# Patient Record
Sex: MALE | Race: WHITE | NOT HISPANIC OR LATINO | Employment: OTHER | URBAN - METROPOLITAN AREA
[De-identification: names, ages, dates, MRNs, and addresses within clinical notes are randomized per-mention and may not be internally consistent; named-entity substitution may affect disease eponyms.]

---

## 2017-01-09 ENCOUNTER — TELEPHONE (OUTPATIENT)
Dept: CARDIOLOGY | Facility: MEDICAL CENTER | Age: 82
End: 2017-01-09
Payer: MEDICARE

## 2017-01-09 DIAGNOSIS — R20.0 NUMBNESS AND TINGLING IN BOTH HANDS: ICD-10-CM

## 2017-01-09 DIAGNOSIS — G47.34 NOCTURNAL HYPOXIA: ICD-10-CM

## 2017-01-09 DIAGNOSIS — R06.00 DYSPNEA, UNSPECIFIED TYPE: Chronic | ICD-10-CM

## 2017-01-09 DIAGNOSIS — R42 DIZZINESS: ICD-10-CM

## 2017-01-09 DIAGNOSIS — I48.91 ATRIAL FIBRILLATION, UNSPECIFIED TYPE (HCC): ICD-10-CM

## 2017-01-09 DIAGNOSIS — R20.2 TINGLING IN EXTREMITIES: ICD-10-CM

## 2017-01-09 DIAGNOSIS — R42 INTERMITTENT LIGHTHEADEDNESS: ICD-10-CM

## 2017-01-09 DIAGNOSIS — R20.2 NUMBNESS AND TINGLING IN BOTH HANDS: ICD-10-CM

## 2017-01-09 DIAGNOSIS — M79.646 PAIN OF FINGER, UNSPECIFIED LATERALITY: ICD-10-CM

## 2017-01-09 NOTE — TELEPHONE ENCOUNTER
Tingling and numbness in his hands for 3-4 weeks, about every night but also sometime during the day, can be both hands at the same time or one sided. For about a year, but seldomly, he has had periods of lightheadedness. Does use oxygen at night. He says he has 50% O2 left in his concentrator but has not had a sleep study recently. Sandeep of a-fib. Called PCP about it and was told it may be circulation issues and should call his cardiologist.      Robert MIJARES.

## 2017-01-09 NOTE — TELEPHONE ENCOUNTER
----- Message from Eric Mcgee, Med Ass't sent at 1/9/2017 10:04 AM PST -----  Regarding: Hands falling asleep X 3 weeks  Contact: [phone]  Isaias Cole called and stated for about 3 weeks now almost nightly his hand has been falling asleep. He says it does alternate hands and lasts about 10 min, sometime longer.    He would like a call back at: [phone].    Thank you so much,    Eric

## 2017-01-10 NOTE — TELEPHONE ENCOUNTER
Do his hands turn white or blue for a few seconds and then return to normal? Check UE arterial and venous duplex, as well as carotid duplex bilaterally. Check OPO on oxygen at night please as well. Get him in to see JI within a few weeks once testing completed. SC

## 2017-01-10 NOTE — TELEPHONE ENCOUNTER
S/w pt he says his hand do not change colors. Explained the tests SC recommends and he agrees to have these done. Pt uses [facility] in [place] for his O2 supplies. Called [facility] and notified them of needing repeat OPO. Faxed order for OPO w/ O2 to [facility] at [phone].    Handed ultrasound orders to Ann in scheduling to schedule in [place] at [place]. Someone will be calling pt about scheduling these.      Pt scheduled with DYLLAN Friday 3/10 in [place] to review studies and assess. Pt does not have transportation and needs to see Dyllan in [place] and this is next appt available. Pt will call if sx worsen and we will call with results.

## 2017-01-19 RX ORDER — ATENOLOL 50 MG/1
50 TABLET ORAL DAILY
Qty: 30 TAB | Refills: 11 | Status: SHIPPED | OUTPATIENT
Start: 2017-01-19 | End: 2018-03-01 | Stop reason: CLARIF

## 2017-01-20 NOTE — TELEPHONE ENCOUNTER
OPO results are normal on 2L O2. Called pt and notified. Tingling is still present every once in awhile. Waiting for US of extremities and carotid. Pt requesting atenolol sent to mail scripts. rx sent to [facility].

## 2017-02-01 ENCOUNTER — TELEPHONE (OUTPATIENT)
Dept: CARDIOLOGY | Facility: MEDICAL CENTER | Age: 82
End: 2017-02-01

## 2017-02-01 NOTE — TELEPHONE ENCOUNTER
----- Message from Lisha Brunson sent at 2/1/2017 10:24 AM PST -----  Regarding: codes for arterial ultrasound  Contact: [phone]  [place] FOR: DYLLAN/darlene  (pt is with Shahnaz now for pre registration)  Shahnaz from [facility] in [place] needs additional codes for arterial ultrasound for eval of upper extremity to get test passed thru medicare  [phone].   Pt is with Shahnaz for pre registration in preparation for his test on 2/13.   (Fax is: [phone])

## 2017-02-01 NOTE — TELEPHONE ENCOUNTER
Dicussed diagnosis and ICD codes. Shahnaz was able to get the test to pass medicare requirements.

## 2017-02-14 DIAGNOSIS — G47.34 NOCTURNAL HYPOXIA: ICD-10-CM

## 2017-02-14 DIAGNOSIS — R06.00 DYSPNEA, UNSPECIFIED TYPE: Chronic | ICD-10-CM

## 2017-02-14 DIAGNOSIS — R42 INTERMITTENT LIGHTHEADEDNESS: ICD-10-CM

## 2017-02-14 DIAGNOSIS — R20.0 NUMBNESS AND TINGLING IN BOTH HANDS: ICD-10-CM

## 2017-02-14 DIAGNOSIS — R20.2 NUMBNESS AND TINGLING IN BOTH HANDS: ICD-10-CM

## 2017-02-21 ENCOUNTER — TELEPHONE (OUTPATIENT)
Dept: CARDIOLOGY | Facility: MEDICAL CENTER | Age: 82
End: 2017-02-21

## 2017-02-22 NOTE — TELEPHONE ENCOUNTER
S/w pt about negative US for DVT. Pt states he is having bilateral numbness in hands that tends to go away with time. Discussed if pt has any worsening symptoms to call back. Pt has appointment to see MD on 3/10

## 2017-03-10 ENCOUNTER — OFFICE VISIT (OUTPATIENT)
Dept: CARDIOLOGY | Facility: PHYSICIAN GROUP | Age: 82
End: 2017-03-10
Payer: MEDICARE

## 2017-03-10 VITALS
BODY MASS INDEX: 23.25 KG/M2 | HEIGHT: 75 IN | OXYGEN SATURATION: 95 % | HEART RATE: 79 BPM | WEIGHT: 187 LBS | SYSTOLIC BLOOD PRESSURE: 112 MMHG | DIASTOLIC BLOOD PRESSURE: 70 MMHG

## 2017-03-10 DIAGNOSIS — I25.10 CORONARY ARTERY DISEASE INVOLVING NATIVE CORONARY ARTERY OF NATIVE HEART WITHOUT ANGINA PECTORIS: ICD-10-CM

## 2017-03-10 DIAGNOSIS — Z79.01 CHRONIC ANTICOAGULATION: ICD-10-CM

## 2017-03-10 DIAGNOSIS — I48.91 ATRIAL FIBRILLATION, UNSPECIFIED TYPE (HCC): ICD-10-CM

## 2017-03-10 DIAGNOSIS — E78.5 DYSLIPIDEMIA: ICD-10-CM

## 2017-03-10 DIAGNOSIS — I42.0 DILATED CARDIOMYOPATHY (HCC): ICD-10-CM

## 2017-03-10 DIAGNOSIS — I10 ESSENTIAL HYPERTENSION, BENIGN: ICD-10-CM

## 2017-03-10 PROCEDURE — G8420 CALC BMI NORM PARAMETERS: HCPCS | Performed by: INTERNAL MEDICINE

## 2017-03-10 PROCEDURE — G8432 DEP SCR NOT DOC, RNG: HCPCS | Performed by: INTERNAL MEDICINE

## 2017-03-10 PROCEDURE — 99214 OFFICE O/P EST MOD 30 MIN: CPT | Performed by: INTERNAL MEDICINE

## 2017-03-10 PROCEDURE — G8598 ASA/ANTIPLAT THER USED: HCPCS | Performed by: INTERNAL MEDICINE

## 2017-03-10 PROCEDURE — 1101F PT FALLS ASSESS-DOCD LE1/YR: CPT | Mod: 8P | Performed by: INTERNAL MEDICINE

## 2017-03-10 PROCEDURE — G8484 FLU IMMUNIZE NO ADMIN: HCPCS | Performed by: INTERNAL MEDICINE

## 2017-03-10 PROCEDURE — 1036F TOBACCO NON-USER: CPT | Performed by: INTERNAL MEDICINE

## 2017-03-10 PROCEDURE — 4040F PNEUMOC VAC/ADMIN/RCVD: CPT | Mod: 8P | Performed by: INTERNAL MEDICINE

## 2017-03-10 ASSESSMENT — ENCOUNTER SYMPTOMS
CHILLS: 0
BLURRED VISION: 0
PND: 0
SHORTNESS OF BREATH: 1
PALPITATIONS: 0
LOSS OF CONSCIOUSNESS: 0
DIZZINESS: 0
ABDOMINAL PAIN: 0
INSOMNIA: 0
MYALGIAS: 0
FEVER: 0
ORTHOPNEA: 0

## 2017-03-10 NOTE — Clinical Note
[facility] [address]  Phone: [phone]  Fax: [phone]              Tej Garrett  9/6/1928    Encounter Date: 3/10/2017    Kofi Moseley M.D.          PROGRESS NOTE:  Subjective:   Tej Garrett is a 87 y.o. male who presents today for follow up of fatigue and shortness of breath with history of atrial fibrillation on chronic anticoagulation therapy.    Since the patient's last visit on 08/29/13, he has been experiencing fatigue and shortness of breath. He denies chest pain, palpitations, nausea/vomiting or diaphoresis. He underwent an echocardiogram which was abnormal as described.    Past Medical History   Diagnosis Date   • Arthritis    • Arrhythmia    • HTN (hypertension)    • prostate      TURP   • depression    • Asthma    • PAC (premature atrial contraction) 11/23/2009   • Benign essential hypertension 11/23/2009   • Chest tightness or pressure 11/23/2009   • Dyspnea 11/23/2009   • Palpitations 11/23/2009     Past Surgical History   Procedure Laterality Date   • Other       Bladder tumor removed   • Jennifer by laparoscopy  8/8/2010     Performed by IMMANUEL GARCIA at SURGERY [facility] ORS   • Umbilical hernia repair  8/8/2010     Performed by IMMANUEL GARCIA at SURGERY [facility] ORS     History reviewed. No pertinent family history.  History   Smoking status   • Former Smoker -- 2.00 packs/day for 30 years   Smokeless tobacco   • Never Used     Comment: Quit 10years ago     Allergies   Allergen Reactions   • Antihistamine Decongestant [Dexbrompheniramine-Pseudoeph] Palpitations     Medications reviewed.    Outpatient Encounter Prescriptions as of 3/10/2017   Medication Sig Dispense Refill   • atenolol (TENORMIN) 50 MG Tab Take 1 Tab by mouth every day. 30 Tab 11   • warfarin (COUMADIN) 2.5 MG Tab   0   • CALCIUM PO Take  by mouth.     • Glycerin, Laxative, (GLYCERIN, ADULT,) 2 GM suppository Insert  "1 Suppository in rectum Once.     • omeprazole (PRILOSEC) 40 MG capsule Take 40 mg by mouth every day.     • anastrozole (ARIMIDEX) 1 MG TABS Take 1 mg by mouth every day.     • Ferrous Sulfate (IRON) 325 (65 FE) MG TABS Take  by mouth.     • naproxen (NAPROSYN) 500 MG TABS Take 500 mg by mouth 2 times a day, with meals.       • magnesium chloride SR (SLOW-MAG) 535 (64 MG) MG TBCR Take 535 mg by mouth 2 Times a Day.       • albuterol (PROVENTIL) 4 MG TABS Take 4 mg by mouth 2 Times a Day.     • warfarin (COUMADIN) 7.5 MG TABS Take 7.5 mg by mouth every day.     • Glycerin-Polysorbate 80 (REFRESH DRY EYE THERAPY OP) by Ophthalmic route.     • fluoxetine (PROZAC) 20 MG CAPS Take 20 mg by mouth every Monday, Wednesday, and Friday.     • carvedilol (COREG) 12.5 MG Tab Take 1 Tab by mouth 2 times a day, with meals. 60 Tab 11   • carvedilol (COREG) 12.5 MG Tab Take 1 Tab by mouth 2 times a day, with meals. 90 Tab 3   • doxazosin (CARDURA) 2 MG TABS Take 2 mg by mouth every day.       No facility-administered encounter medications on file as of 3/10/2017.     Review of Systems   Constitutional: Positive for malaise/fatigue. Negative for fever and chills.   HENT: Negative for congestion.         Runny eyes.   Eyes: Negative for blurred vision.   Respiratory: Positive for shortness of breath.    Cardiovascular: Negative for chest pain, palpitations, orthopnea, leg swelling and PND.   Gastrointestinal: Negative for abdominal pain.   Genitourinary: Negative for dysuria.   Musculoskeletal: Negative for myalgias and joint pain.   Skin: Negative for rash.   Neurological: Negative for dizziness and loss of consciousness.   Psychiatric/Behavioral: The patient does not have insomnia.         Objective:   /70 mmHg  Pulse 79  Ht 1.892 m (6' 2.5\")  Wt 84.823 kg (187 lb)  BMI 23.70 kg/m2  SpO2 95%    Physical Exam   Constitutional: He is oriented to person, place, and time. He appears well-developed and well-nourished.   "   HENT:   Head: Normocephalic and atraumatic.   Eyes: Conjunctivae are normal. Pupils are equal, round, and reactive to light.   Neck: Normal range of motion. Neck supple.   Cardiovascular: Normal rate.  An irregularly irregular rhythm present.   Pulmonary/Chest: Effort normal and breath sounds normal.   Abdominal: Soft. Bowel sounds are normal.   Musculoskeletal: Normal range of motion. He exhibits no edema.   Neurological: He is alert and oriented to person, place, and time.   Skin: Skin is warm and dry.   Psychiatric: He has a normal mood and affect.     CARDIAC STUDIES/PROCEDURES:    CARDIAC CATHETERIZATION CONCLUSIONS by Dr. Dalal (08/05/10)  1. Minimal coronary artery disease with mild plaquing in the   proximal circumflex and probable mild bridging in the mid left   anterior descending.   2. Normal left ventricular systolic function.   3. Systemic hypertension.   4. Moderate dilatation of the aortic root without aortic insufficiency.    CAROTID ULTRASOUND (02/13/17)  Carotid ultrasound showing moderate stenosis.    CT OF CHEST (11/01/13)  Moderate atherosclerosis with coronary involvement.    ECHOCARDIOGRAM CONCLUSIONS (07/29/13)  Echocardiogram showing ejection fraction of 45%, mild mitral regurgitation and stenosis. The ascending aorta was not well visualized.    ECHOCARDIOGRAM CONCLUSIONS (07/29/13)  Echocardiogram showing ejection fraction of 50-55%, no significant valvular abnormalities. There is mild dilation of ascending aorta.    EKG performed on (03/28/13): EKG shows atrial fibrillation with right bundle branch block.    Laboratory results of (10/27/12). Cholesterol profile of 160/65/49/98 noted.UPPER EXTREMITY ARTERIAL ULTRASOUND    UPPER EXTREMITY ARTERIAL ULTRASOUND  Upper extremity arterial study showing no hemodynamically significant stenosis is demonstrated involving either upper arterial system.    UPPER  EXTREMITY VENOUS ULTRASOUND (02/13/17)  Bilateral upper extremity venous study showing no  evidence of deep venous thrombosis.    Assessment:     Patient Active Problem List    Diagnosis Date Noted   • Atrial fibrillation/Chronic anticoagulation 12/10/2012     Priority: High   • Dilated cardiomyopathy 12/10/2012     Priority: High   • CAD (coronary artery disease) 09/08/2014     Priority: Medium   • Hyperlipidemia 09/08/2014     Priority: Low   • Hypertension 09/08/2014     Priority: Low   • Aortic root dilatation 09/08/2014     Medical Decision Making:  Today's Assessment / Status / Plan:     1. Atrial fibrillation on anticoagulation therapy (warfarin): He is experiencing fatigue and shortness of breath.   2. Dilated cardiomyopathy: New reduced left ventricular systolic function noted. We will perform a myocardial perfusion imaging study. We will repeat an echocardiogram in 6 months.  3. Coronary artery disease: He remains clinically stable. We will continue with current medical care. Plan as above.  4. Hypertension: Blood pressure is well controlled.  5. Hyperlipidemia: Stable on strict diet and exercise therapy. We will repeat labs including fasting lipid profile in 6 months.  6. Ascending aorta dilation: Mild per last echocardiogram.  7. Breast cancer treated with Tamoxifen.    We will follow up in six months with labs echocardiogram and myocardial perfusion scan.    CC Alejandro Barrera        No Recipients

## 2017-03-10 NOTE — PROGRESS NOTES
Subjective:   Tej Garrett is a 87 y.o. male who presents today for follow up of fatigue and shortness of breath with history of atrial fibrillation on chronic anticoagulation therapy.    Since the patient's last visit on 08/29/13, he has been experiencing fatigue and shortness of breath. He denies chest pain, palpitations, nausea/vomiting or diaphoresis. He underwent an echocardiogram which was abnormal as described.    Past Medical History   Diagnosis Date   • Arthritis    • Arrhythmia    • HTN (hypertension)    • prostate      TURP   • depression    • Asthma    • PAC (premature atrial contraction) 11/23/2009   • Benign essential hypertension 11/23/2009   • Chest tightness or pressure 11/23/2009   • Dyspnea 11/23/2009   • Palpitations 11/23/2009     Past Surgical History   Procedure Laterality Date   • Other       Bladder tumor removed   • Jennifer by laparoscopy  8/8/2010     Performed by IMMANUEL GARCIA at SURGERY [facility]   • Umbilical hernia repair  8/8/2010     Performed by IMMANUEL GARCIA at [facility]     History reviewed. No pertinent family history.  History   Smoking status   • Former Smoker -- 2.00 packs/day for 30 years   Smokeless tobacco   • Never Used     Comment: Quit 10years ago     Allergies   Allergen Reactions   • Antihistamine Decongestant [Dexbrompheniramine-Pseudoeph] Palpitations     Medications reviewed.    Outpatient Encounter Prescriptions as of 3/10/2017   Medication Sig Dispense Refill   • atenolol (TENORMIN) 50 MG Tab Take 1 Tab by mouth every day. 30 Tab 11   • warfarin (COUMADIN) 2.5 MG Tab   0   • CALCIUM PO Take  by mouth.     • Glycerin, Laxative, (GLYCERIN, ADULT,) 2 GM suppository Insert 1 Suppository in rectum Once.     • omeprazole (PRILOSEC) 40 MG capsule Take 40 mg by mouth every day.     • anastrozole (ARIMIDEX) 1 MG TABS Take 1 mg by mouth every day.     • Ferrous Sulfate (IRON) 325 (65 FE) MG TABS Take  by mouth.     • naproxen (NAPROSYN) 500 MG  "TABS Take 500 mg by mouth 2 times a day, with meals.       • magnesium chloride SR (SLOW-MAG) 535 (64 MG) MG TBCR Take 535 mg by mouth 2 Times a Day.       • albuterol (PROVENTIL) 4 MG TABS Take 4 mg by mouth 2 Times a Day.     • warfarin (COUMADIN) 7.5 MG TABS Take 7.5 mg by mouth every day.     • Glycerin-Polysorbate 80 (REFRESH DRY EYE THERAPY OP) by Ophthalmic route.     • fluoxetine (PROZAC) 20 MG CAPS Take 20 mg by mouth every Monday, Wednesday, and Friday.     • carvedilol (COREG) 12.5 MG Tab Take 1 Tab by mouth 2 times a day, with meals. 60 Tab 11   • carvedilol (COREG) 12.5 MG Tab Take 1 Tab by mouth 2 times a day, with meals. 90 Tab 3   • doxazosin (CARDURA) 2 MG TABS Take 2 mg by mouth every day.       No facility-administered encounter medications on file as of 3/10/2017.     Review of Systems   Constitutional: Positive for malaise/fatigue. Negative for fever and chills.   HENT: Negative for congestion.         Runny eyes.   Eyes: Negative for blurred vision.   Respiratory: Positive for shortness of breath.    Cardiovascular: Negative for chest pain, palpitations, orthopnea, leg swelling and PND.   Gastrointestinal: Negative for abdominal pain.   Genitourinary: Negative for dysuria.   Musculoskeletal: Negative for myalgias and joint pain.   Skin: Negative for rash.   Neurological: Negative for dizziness and loss of consciousness.   Psychiatric/Behavioral: The patient does not have insomnia.         Objective:   /70 mmHg  Pulse 79  Ht 1.892 m (6' 2.5\")  Wt 84.823 kg (187 lb)  BMI 23.70 kg/m2  SpO2 95%    Physical Exam   Constitutional: He is oriented to person, place, and time. He appears well-developed and well-nourished.   HENT:   Head: Normocephalic and atraumatic.   Eyes: Conjunctivae are normal. Pupils are equal, round, and reactive to light.   Neck: Normal range of motion. Neck supple.   Cardiovascular: Normal rate.  An irregularly irregular rhythm present.   Pulmonary/Chest: Effort " normal and breath sounds normal.   Abdominal: Soft. Bowel sounds are normal.   Musculoskeletal: Normal range of motion. He exhibits no edema.   Neurological: He is alert and oriented to person, place, and time.   Skin: Skin is warm and dry.   Psychiatric: He has a normal mood and affect.     CARDIAC STUDIES/PROCEDURES:    CARDIAC CATHETERIZATION CONCLUSIONS by Dr. Dalal (08/05/10)  1. Minimal coronary artery disease with mild plaquing in the   proximal circumflex and probable mild bridging in the mid left   anterior descending.   2. Normal left ventricular systolic function.   3. Systemic hypertension.   4. Moderate dilatation of the aortic root without aortic insufficiency.    CAROTID ULTRASOUND (02/13/17)  Carotid ultrasound showing moderate stenosis.    CT OF CHEST (11/01/13)  Moderate atherosclerosis with coronary involvement.    ECHOCARDIOGRAM CONCLUSIONS (07/29/13)  Echocardiogram showing ejection fraction of 45%, mild mitral regurgitation and stenosis. The ascending aorta was not well visualized.    ECHOCARDIOGRAM CONCLUSIONS (07/29/13)  Echocardiogram showing ejection fraction of 50-55%, no significant valvular abnormalities. There is mild dilation of ascending aorta.    EKG performed on (03/28/13): EKG shows atrial fibrillation with right bundle branch block.    Laboratory results of (10/27/12). Cholesterol profile of 160/65/49/98 noted.UPPER EXTREMITY ARTERIAL ULTRASOUND    UPPER EXTREMITY ARTERIAL ULTRASOUND  Upper extremity arterial study showing no hemodynamically significant stenosis is demonstrated involving either upper arterial system.    UPPER  EXTREMITY VENOUS ULTRASOUND (02/13/17)  Bilateral upper extremity venous study showing no evidence of deep venous thrombosis.    Assessment:     Patient Active Problem List    Diagnosis Date Noted   • Atrial fibrillation/Chronic anticoagulation 12/10/2012     Priority: High   • Dilated cardiomyopathy 12/10/2012     Priority: High   • CAD (coronary artery  disease) 09/08/2014     Priority: Medium   • Hyperlipidemia 09/08/2014     Priority: Low   • Hypertension 09/08/2014     Priority: Low   • Aortic root dilatation 09/08/2014     Medical Decision Making:  Today's Assessment / Status / Plan:     1. Atrial fibrillation on anticoagulation therapy (warfarin): He is experiencing fatigue and shortness of breath.   2. Dilated cardiomyopathy: New reduced left ventricular systolic function noted. We will perform a myocardial perfusion imaging study. We will repeat an echocardiogram in 6 months.  3. Coronary artery disease: He remains clinically stable. We will continue with current medical care. Plan as above.  4. Hypertension: Blood pressure is well controlled.  5. Hyperlipidemia: Stable on strict diet and exercise therapy. We will repeat labs including fasting lipid profile in 6 months.  6. Ascending aorta dilation: Mild per last echocardiogram.  7. Breast cancer treated with Tamoxifen.    We will follow up in six months with labs echocardiogram and myocardial perfusion scan.    CC Alejandro Barrera

## 2017-03-17 ENCOUNTER — TELEPHONE (OUTPATIENT)
Dept: CARDIOLOGY | Facility: MEDICAL CENTER | Age: 82
End: 2017-03-17

## 2017-03-18 NOTE — TELEPHONE ENCOUNTER
----- Message from Ashlyn Nelson sent at 3/17/2017 12:27 PM PDT -----  Regarding: Oxygen recertification  DYLLAN/[employer]        Patient received letter from oxygen supplier for re-certification. Would like to discuss with you further. He can be reached at [phone]

## 2017-03-18 NOTE — TELEPHONE ENCOUNTER
S/w pt about receiving the medicare O2 recertification letter. Advised pt to call his PCP as they should be ordering oxygen. Pt wondering if he needed another OPO for his renewal. Pt had an OPO ordered by DYLLAN 1/09/2017. Discussed and decided the results of the OPO would be faxed to PCP as pt said Dr. Dallas didn't know about the recent OPO.     OPO faxed to Dr. Dallas at [phone]

## 2017-03-24 DIAGNOSIS — I42.0 DILATED CARDIOMYOPATHY (HCC): ICD-10-CM

## 2017-03-27 ENCOUNTER — TELEPHONE (OUTPATIENT)
Dept: CARDIOLOGY | Facility: MEDICAL CENTER | Age: 82
End: 2017-03-27

## 2017-03-28 NOTE — TELEPHONE ENCOUNTER
Pt says he is feeling fine besides having trouble with tingling in his hands. Pt says he has no ride to get to [place], he may need to ask a neighbor but not sure. First f/u with JI in [place] is 4/14 but full and first opening is 5/5.  Pt is going to have an MPI tomorrow. Will f/u on these results tomorrow . Dicussed repeat cath and pt would be agreeable. He thinks he can arrange it with neighbors and a nephew that lives in [place].

## 2017-03-28 NOTE — TELEPHONE ENCOUNTER
Called pt and informed him that stress test results have not come through yet. Pt would still like to wait to proceed with angiogram until after all test results are in.

## 2017-03-28 NOTE — TELEPHONE ENCOUNTER
----- Message from Kofi Moseley M.D. sent at 3/27/2017  4:28 PM PDT -----  Please call with abnormal echocardiogram results showing reduced left ventricular systolic function with ejection fraction of 25-30%. Please let him know that we need to schedule for repeat cath or follow up to discuss it.    Thanks.  DYLLAN

## 2017-03-29 DIAGNOSIS — I42.0 DILATED CARDIOMYOPATHY (HCC): ICD-10-CM

## 2017-03-29 NOTE — TELEPHONE ENCOUNTER
"Received results of MPI which only show an area of \"old infarction\" and no LV ischemia.    To JI. Is angiogram still warranted?  "

## 2017-03-30 NOTE — TELEPHONE ENCOUNTER
Called pt and he had not yet talked to DYLLAN. Explained that DYLLAN is still recommending angiogram d/t the drop in LV function. Pt understands and agrees to proceed. Explained that Layo would call him to schedule. Message sent to Layo.

## 2017-03-30 NOTE — TELEPHONE ENCOUNTER
Message sent to DYLLAN to call pt regarding recommendations as pt cannot get into town easily as he does not drive.     Message  Received: Today       TIEN Cobos R.N.                   Please call with equivocal myocardial perfusion imaging study results. I still recommend cath to figure out why his left ventricular systolic function has significantly declined.     Thanks.  DYLLAN.            Previous Messages       ----- Message -----      From: Perla Black R.N.      Sent: 3/29/2017   4:27 PM        To: Kofi Moseley M.D.     To DYLLAN. Please advise on results in consideration of abnormal echo.                         NM-CARDIAC STRESS TEST   Status: Final result     Visible to patient:  Not Released     Dx:  Dilated cardiomyopathy (CMS-HCC)               Notes Recorded by Perla Black R.N. on 3/29/2017 at 4:27 PM  To DYLLAN. Please advise on results in consideration of abnormal echo.

## 2017-04-03 NOTE — TELEPHONE ENCOUNTER
Message sent to Layo.    RE: jocelyne baltazar  Received: 2 days ago       TIEN Cobos R.N. FYI-I spoke to him on 03/31/17 and he agrees with cath. Please schedule cath with me. Hold warfarin for 5 days.     Thanks.  FELIX

## 2017-04-04 ENCOUNTER — TELEPHONE (OUTPATIENT)
Dept: CARDIOLOGY | Facility: MEDICAL CENTER | Age: 82
End: 2017-04-04

## 2017-04-04 NOTE — TELEPHONE ENCOUNTER
----- Message from Perla Black R.N. sent at 4/4/2017  4:01 PM PDT -----  Regarding: FW: scehdule cath      ----- Message -----     From: Kofi Moseley M.D.     Sent: 4/1/2017  12:52 PM       To: Perla Black R.N.  Subject: RE: scehdule cath                                CHICO-I spoke to him on 03/31/17 and he agrees with cath. Please schedule cath with me. Hold warfarin for 5 days.    Thanks.  FELIX    ----- Message -----     From: Perla Black R.N.     Sent: 3/31/2017   3:08 PM       To: Kofi Moseley M.D.  Subject: FW: scehdule cath                                    ----- Message -----     From: Lazara Rico, Med Ass't     Sent: 3/31/2017   2:59 PM       To: Perla Black R.N.  Subject: RE: scehdule cath                                [employer],    This patient is taking Coumadin. How many days would Dr. Moseley like this patient to hold the Coumadin for this procedure? Also, does Dr. Moseley want me to schedule this cath with him?    Thank You,  Lazara  ----- Message -----     From: Lazara Rico, Med Ass't     Sent: 3/31/2017   2:58 PM       To: Lazara Rico, Med Ass't  Subject: FW: scehdule cath                                    ----- Message -----     From: Perla Black R.N.     Sent: 3/30/2017   4:31 PM       To: Layo Pete  Subject: scehdule cath                                    Ciro Vasques,     Please schedule pt to left heart cath for reduced LV function per DYLLAN.     Thank you

## 2017-04-05 ENCOUNTER — TELEPHONE (OUTPATIENT)
Dept: CARDIOLOGY | Facility: MEDICAL CENTER | Age: 82
End: 2017-04-05

## 2017-04-05 NOTE — TELEPHONE ENCOUNTER
Patient is scheduled on 4-20-17 for a [facility] w/poss with Dr. Moseley at [facility]. Patient was told to hold coumadin 5 days prior, so last dose will be on the 15th. Patient was told to check in at 8:00am for a 10:00 procedure.

## 2017-04-07 ENCOUNTER — TELEPHONE (OUTPATIENT)
Dept: CARDIOLOGY | Facility: MEDICAL CENTER | Age: 82
End: 2017-04-07

## 2017-04-07 NOTE — TELEPHONE ENCOUNTER
Called pt back and he s/w [facility] and feels the problem is taken care of.  He received a notice from Medicare that they could not cover his last OPO. [facility] told pt to wait it out and they would deal with it if Medicare will not cover it. We reviewed his upcoming angiogram and that he is to stop taking his coumadin starting 4/15.

## 2017-04-07 NOTE — TELEPHONE ENCOUNTER
----- Message from Ashlyn Nelson sent at 4/6/2017  1:01 PM PDT -----  Regarding: insurance won't cover his OPO  DYLLAN/[employer]      Patient said his insurance is refusing to pay for the OPO. He would like a call back at [phone].

## 2017-04-14 ENCOUNTER — TELEPHONE (OUTPATIENT)
Dept: CARDIOLOGY | Facility: MEDICAL CENTER | Age: 82
End: 2017-04-14

## 2017-04-14 DIAGNOSIS — I48.91 ATRIAL FIBRILLATION, UNSPECIFIED TYPE (HCC): ICD-10-CM

## 2017-04-14 DIAGNOSIS — Z01.812 PRE-PROCEDURAL LABORATORY EXAMINATION: ICD-10-CM

## 2017-04-14 DIAGNOSIS — Z79.01 CHRONIC ANTICOAGULATION: ICD-10-CM

## 2017-04-14 DIAGNOSIS — Z01.810 PRE-OPERATIVE CARDIOVASCULAR EXAMINATION: ICD-10-CM

## 2017-04-14 LAB
ANION GAP SERPL CALC-SCNC: 5 MMOL/L (ref 0–11.9)
BUN SERPL-MCNC: 24 MG/DL (ref 8–22)
CALCIUM SERPL-MCNC: 9.7 MG/DL (ref 8.5–10.5)
CHLORIDE SERPL-SCNC: 105 MMOL/L (ref 96–112)
CO2 SERPL-SCNC: 31 MMOL/L (ref 20–33)
CREAT SERPL-MCNC: 1.34 MG/DL (ref 0.5–1.4)
EKG IMPRESSION: NORMAL
ERYTHROCYTE [DISTWIDTH] IN BLOOD BY AUTOMATED COUNT: 52.6 FL (ref 35.9–50)
GFR SERPL CREATININE-BSD FRML MDRD: 50 ML/MIN/1.73 M 2
GLUCOSE SERPL-MCNC: 88 MG/DL (ref 65–99)
HCT VFR BLD AUTO: 40.2 % (ref 42–52)
HGB BLD-MCNC: 13.8 G/DL (ref 14–18)
INR PPP: 3.76 (ref 0.87–1.13)
MCH RBC QN AUTO: 34.6 PG (ref 27–33)
MCHC RBC AUTO-ENTMCNC: 34.3 G/DL (ref 33.7–35.3)
MCV RBC AUTO: 100.8 FL (ref 81.4–97.8)
MORPHOLOGY BLD-IMP: NORMAL
PLATELET # BLD AUTO: 107 K/UL (ref 164–446)
PMV BLD AUTO: 11.9 FL (ref 9–12.9)
POTASSIUM SERPL-SCNC: 4.6 MMOL/L (ref 3.6–5.5)
PROTHROMBIN TIME: 38.3 SEC (ref 12–14.6)
RBC # BLD AUTO: 3.99 M/UL (ref 4.7–6.1)
SODIUM SERPL-SCNC: 141 MMOL/L (ref 135–145)
WBC # BLD AUTO: 5.2 K/UL (ref 4.8–10.8)

## 2017-04-14 PROCEDURE — 36415 COLL VENOUS BLD VENIPUNCTURE: CPT

## 2017-04-14 PROCEDURE — 80048 BASIC METABOLIC PNL TOTAL CA: CPT

## 2017-04-14 PROCEDURE — 85027 COMPLETE CBC AUTOMATED: CPT

## 2017-04-14 PROCEDURE — 85610 PROTHROMBIN TIME: CPT

## 2017-04-15 NOTE — TELEPHONE ENCOUNTER
Reviewed lab results with DYLLAN. Per DYLLAN pt needs to hold coumadin starting tonight for a supratherautic INR of 3.76.     Called pt and notified. He agrees to stop coumadin tonight. His PCP is managing his coumadin but not very closely per Pt. Referred pt to coumadin clinic and advised pt to give them a call after the procedure. Pt agreed this was a good  Idea.     Referral faxed to oac clinic at [phone]

## 2017-04-20 ENCOUNTER — HOSPITAL ENCOUNTER (OUTPATIENT)
Facility: MEDICAL CENTER | Age: 82
End: 2017-04-20
Attending: INTERNAL MEDICINE | Admitting: INTERNAL MEDICINE
Payer: MEDICARE

## 2017-04-20 VITALS
HEART RATE: 66 BPM | OXYGEN SATURATION: 98 % | HEIGHT: 74 IN | DIASTOLIC BLOOD PRESSURE: 99 MMHG | BODY MASS INDEX: 24.22 KG/M2 | WEIGHT: 188.71 LBS | RESPIRATION RATE: 17 BRPM | SYSTOLIC BLOOD PRESSURE: 131 MMHG | TEMPERATURE: 98.9 F

## 2017-04-20 PROBLEM — R93.1 ABNORMAL ECHOCARDIOGRAM: Status: ACTIVE | Noted: 2017-04-20

## 2017-04-20 LAB
INR PPP: 1.33 (ref 0.87–1.13)
PROTHROMBIN TIME: 16.9 SEC (ref 12–14.6)

## 2017-04-20 PROCEDURE — A9270 NON-COVERED ITEM OR SERVICE: HCPCS | Performed by: INTERNAL MEDICINE

## 2017-04-20 PROCEDURE — 700111 HCHG RX REV CODE 636 W/ 250 OVERRIDE (IP)

## 2017-04-20 PROCEDURE — C1887 CATHETER, GUIDING: HCPCS

## 2017-04-20 PROCEDURE — 307093 HCHG TR BAND RADIAL

## 2017-04-20 PROCEDURE — C1769 GUIDE WIRE: HCPCS

## 2017-04-20 PROCEDURE — 85610 PROTHROMBIN TIME: CPT

## 2017-04-20 PROCEDURE — C1894 INTRO/SHEATH, NON-LASER: HCPCS

## 2017-04-20 PROCEDURE — 93458 L HRT ARTERY/VENTRICLE ANGIO: CPT

## 2017-04-20 PROCEDURE — 99152 MOD SED SAME PHYS/QHP 5/>YRS: CPT

## 2017-04-20 PROCEDURE — 360979 HCHG DIAGNOSTIC CATH

## 2017-04-20 PROCEDURE — 700102 HCHG RX REV CODE 250 W/ 637 OVERRIDE(OP): Performed by: INTERNAL MEDICINE

## 2017-04-20 PROCEDURE — 700101 HCHG RX REV CODE 250

## 2017-04-20 RX ORDER — VERAPAMIL HYDROCHLORIDE 2.5 MG/ML
INJECTION, SOLUTION INTRAVENOUS
Status: COMPLETED
Start: 2017-04-20 | End: 2017-04-20

## 2017-04-20 RX ORDER — LIDOCAINE HYDROCHLORIDE 20 MG/ML
INJECTION, SOLUTION INFILTRATION; PERINEURAL
Status: COMPLETED
Start: 2017-04-20 | End: 2017-04-20

## 2017-04-20 RX ORDER — SODIUM CHLORIDE 9 MG/ML
INJECTION, SOLUTION INTRAVENOUS
Status: DISCONTINUED | OUTPATIENT
Start: 2017-04-20 | End: 2017-04-20

## 2017-04-20 RX ORDER — SODIUM CHLORIDE 9 MG/ML
INJECTION, SOLUTION INTRAVENOUS CONTINUOUS
Status: ACTIVE | OUTPATIENT
Start: 2017-04-20 | End: 2017-04-20

## 2017-04-20 RX ORDER — HEPARIN SODIUM,PORCINE 1000/ML
VIAL (ML) INJECTION
Status: COMPLETED
Start: 2017-04-20 | End: 2017-04-20

## 2017-04-20 RX ORDER — MIDAZOLAM HYDROCHLORIDE 1 MG/ML
INJECTION INTRAMUSCULAR; INTRAVENOUS
Status: COMPLETED
Start: 2017-04-20 | End: 2017-04-20

## 2017-04-20 RX ADMIN — MIDAZOLAM 2 MG: 1 INJECTION INTRAMUSCULAR; INTRAVENOUS at 11:50

## 2017-04-20 RX ADMIN — HEPARIN SODIUM 2000 UNITS: 200 INJECTION, SOLUTION INTRAVENOUS at 11:50

## 2017-04-20 RX ADMIN — SODIUM CHLORIDE: 9 INJECTION, SOLUTION INTRAVENOUS at 12:30

## 2017-04-20 RX ADMIN — FENTANYL CITRATE 100 MCG: 50 INJECTION, SOLUTION INTRAMUSCULAR; INTRAVENOUS at 11:50

## 2017-04-20 RX ADMIN — NITROGLYCERIN 10 ML: 20 INJECTION INTRAVENOUS at 11:48

## 2017-04-20 RX ADMIN — SODIUM CHLORIDE: 9 INJECTION, SOLUTION INTRAVENOUS at 08:45

## 2017-04-20 RX ADMIN — LIDOCAINE HYDROCHLORIDE: 20 INJECTION, SOLUTION INFILTRATION; PERINEURAL at 11:49

## 2017-04-20 RX ADMIN — HEPARIN SODIUM: 1000 INJECTION, SOLUTION INTRAVENOUS; SUBCUTANEOUS at 11:49

## 2017-04-20 RX ADMIN — VERAPAMIL HYDROCHLORIDE 5 MG: 2.5 INJECTION, SOLUTION INTRAVENOUS at 11:49

## 2017-04-20 ASSESSMENT — PAIN SCALES - GENERAL
PAINLEVEL_OUTOF10: 0

## 2017-04-20 ASSESSMENT — ENCOUNTER SYMPTOMS
PND: 0
PALPITATIONS: 0
CLAUDICATION: 0
ORTHOPNEA: 0
SHORTNESS OF BREATH: 1
COUGH: 0

## 2017-04-20 NOTE — IP AVS SNAPSHOT
4/20/2017    Tej Garrett  [address] [place] [address]    Dear Tej:    [facility] wants to ensure your discharge home is safe and you or your loved ones have had all of your questions answered regarding your care after you leave the hospital.    Below is a list of resources and contact information should you have any questions regarding your hospital stay, follow-up instructions, or active medical symptoms.    Questions or Concerns Regarding… Contact   Medical Questions Related to Your Discharge  (7 days a week, 8am-5pm) Contact a Nurse Care Coordinator   [phone]   Medical Questions Not Related to Your Discharge  (24 hours a day / 7 days a week)  Contact the Nurse Health Line   [phone]    Medications or Discharge Instructions Refer to your discharge packet   or contact your [facility] Primary Care Provider   [phone]   Follow-up Appointment(s) Schedule your appointment via [employer]   or contact Scheduling [phone]   Billing Review your statement via [employer]  or contact Billing [phone]   Medical Records Review your records via [employer]   or contact Medical Records [phone]     You may receive a telephone call within two days of discharge. This call is to make certain you understand your discharge instructions and have the opportunity to have any questions answered. You can also easily access your medical information, test results and upcoming appointments via the [employer] free online health management tool. You can learn more and sign up at [employer]. For assistance setting up your [employer] account, please call [phone].    Once again, we want to ensure your discharge home is safe and that you have a clear understanding of any next steps in your care. If you have any questions or concerns, please do not hesitate to contact us, we are here for you. Thank you for choosing [facility] for your healthcare needs.    Sincerely,    Your [facility] Healthcare Team

## 2017-04-20 NOTE — PROCEDURES
DATE OF SERVICE:  04/20/2017    REFERRING PHYSICIAN:  Kofi Moseley MD    PROCEDURE:  1.  Left heart catheterization.  2.  Coronary angiography.  3.  Left ventriculogram.    PREPROCEDURE DIAGNOSIS:  New dilated cardiomyopathy.    POSTPROCEDURE DIAGNOSES:  1.  No angiographic evidence of coronary artery disease.  2.  Severely reduced left ventricular systolic function with ejection fraction of 20%.  3.  Elevated left ventricular end-diastolic pressure.    INDICATION:  The patient is an 87-year-old, seen at an 87-year-old male with   past medical history significant for atrial fibrillation, on chronic   anticoagulation therapy.  He recently underwent an echocardiogram, which   showed reduced left ventricular systolic function, which is new and change   from his prior study.  He was scheduled for cardiac catheterization.    DESCRIPTION OF PROCEDURE:  After informed consent was signed by the   patient, the patient was brought to the cardiac catheterization laboratory.  He   was prepped and draped in usual sterile manner.  The right wrist area was   anesthetized with 2% Xylocaine.  A 6-Moroccan sheath was inserted into the right   radial artery using the modified Seldinger technique.  Intra-arterial   verapamil and nitroglycerin were given.  IV heparin was given.  A 6-Moroccan TIG   catheter was positioned into the left main coronary artery.  Coronary   angiography was performed.  This catheter was directed into the right coronary   artery and right coronary angiography was performed.  This catheter was   exchanged for a pigtail catheter, which was positioned into the left   ventricle.  Left ventriculography was performed.  The patient tolerated the   procedure well.  At the end of procedure, all catheters and sheaths were   removed.  Hemoband was placed, the right wrist.  He was transferred to [facility] in   stable condition.    HEMODYNAMIC DATA:  Hemodynamic data shows aortic pressures of 140/80 with mean   of 100 mmHg and LV  140/0 with LVEDP of 16 mmHg.    AORTIC VALVE:  There was no significant gradient noted.    LEFT VENTRICULOGRAM:  A 10 mL of contrast were delivered for 3 seconds.    Ejection fraction was estimated to be 20%.  There was global hypokinesis   noted.    ANGIOGRAM:  LEFT MAIN CORONARY ARTERY:  Left main coronary artery and long large caliber   vessel free of disease.    LEFT ANTERIOR DESCENDING ARTERY:  Left anterior descending artery is a long   large caliber vessel, which wraps around the apex.  There are 2 moderate   caliber long diagonal branch noted.  Left circumflex artery and its branches   are free of disease.    LEFT CIRCUMFLEX ARTERY:  Left circumflex artery is a predominantly dominant   large caliber vessel with mid luminal irregularities of 10%.  There is a large   trifurcating of obtuse marginal branch with proximal diffuse 10-20%   stenosis.  Distally, there is small caliber posterior lateral branch x2.    RIGHT CORONARY ARTERY:  Right coronary artery is a codominant moderate-caliber   vessel with very small posterior descending artery.  Right coronary artery   and its branches are free of disease.    IMPRESSION:  1.  Minimal coronary artery disease.  2.  Severely reduced left ventricular systolic function with ejection fraction of 20%.  3.  Elevated left ventricular end-diastolic pressure.    RECOMMENDATIONS:  Recommend medical therapy and reevaluation of the left   ventricular systolic function, EP consult for possible AICD placement.       ____________________________________     MD ALETHA DANIELLE / OMID    DD:  04/20/2017 11:55:48  DT:  04/20/2017 12:13:18    D#:  [id number]  Job#:  [id number]

## 2017-04-20 NOTE — IP AVS SNAPSHOT
" Home Care Instructions                                                                                                                Name:Tej Garrett  Medical Record Number:[id number]  CSN: [id number]    YOB: 1928   Age: 88 y.o.  Sex: male  HT:1.892 m (6' 2.49\") WT: 85.6 kg (188 lb 11.4 oz)          Admit Date: 4/20/2017     Discharge Date:   Today's Date: 4/20/2017  Attending Doctor:  oKfi Moseley M.D.                  Allergies:  Antihistamine decongestant                Discharge Instructions         ACTIVITY: Rest and take it easy for the first 24 hours.  A responsible adult is recommended to remain with you during that time.  It is normal to feel sleepy.  We encourage you to not do anything that requires balance, judgment or coordination.    MILD FLU-LIKE SYMPTOMS ARE NORMAL. YOU MAY EXPERIENCE GENERALIZED MUSCLE ACHES, THROAT IRRITATION, HEADACHE AND/OR SOME NAUSEA.    FOR 24 HOURS DO NOT:  Drive, operate machinery or run household appliances.  Drink beer or alcoholic beverages.   Make important decisions or sign legal documents.    SPECIAL INSTRUCTIONS: *KEEP INCISION DRY FOR 24 HRS **    DIET: To avoid nausea, slowly advance diet as tolerated, avoiding spicy or greasy foods for the first day.  Add more substantial food to your diet according to your physician's instructions.  Babies can be fed formula or breast milk as soon as they are hungry.  INCREASE FLUIDS AND FIBER TO AVOID CONSTIPATION.    SURGICAL DRESSING/BATHING: *MAY SHOWER TOMORROW AFTERNOON THEN MAY REMOVE DRESSING**    FOLLOW-UP APPOINTMENT:  A follow-up appointment should be arranged with your doctor in *[facility] [place]   [phone]**; call to schedule.    You should CALL YOUR PHYSICIAN if you develop:  Fever greater than 101 degrees F.  Pain not relieved by medication, or persistent nausea or vomiting.  Excessive bleeding (blood soaking through dressing) or unexpected drainage from the wound.  Extreme redness or swelling around the " incision site, drainage of pus or foul smelling drainage.  Inability to urinate or empty your bladder within 8 hours.  Problems with breathing or chest pain.    You should call 911 if you develop problems with breathing or chest pain.  If you are unable to contact your doctor or surgical center, you should go to the nearest emergency room or urgent care center.  Physician's telephone #: *[phone]**    If any questions arise, call your doctor.  If your doctor is not available, please feel free to call the Surgical Center at [phone].  The Center is open Monday through Friday from 7AM to 7PM.  You can also call the HEALTH HOTLINE open 24 hours/day, 7 days/week and speak to a nurse at [phone], or toll free at [phone].    A registered nurse may call you a few days after your surgery to see how you are doing after your procedure.    MEDICATIONS: Resume taking daily medication.  Take prescribed pain medication with food.  If no medication is prescribed, you may take non-aspirin pain medication if needed.  PAIN MEDICATION CAN BE VERY CONSTIPATING.  Take a stool softener or laxative such as senokot, pericolace, or milk of magnesia if needed.        If your physician has prescribed pain medication that includes Acetaminophen (Tylenol), do not take additional Acetaminophen (Tylenol) while taking the prescribed medication.    Depression / Suicide Risk    As you are discharged from this [facility] facility, it is important to learn how to keep safe from harming yourself.    Recognize the warning signs:  · Abrupt changes in personality, positive or negative- including increase in energy   · Giving away possessions  · Change in eating patterns- significant weight changes-  positive or negative  · Change in sleeping patterns- unable to sleep or sleeping all the time   · Unwillingness or inability to communicate  · Depression  · Unusual sadness, discouragement and loneliness  · Talk of wanting to  die  · Neglect of personal appearance   · Rebelliousness- reckless behavior  · Withdrawal from people/activities they love  · Confusion- inability to concentrate     If you or a loved one observes any of these behaviors or has concerns about self-harm, here's what you can do:  · Talk about it- your feelings and reasons for harming yourself  · Remove any means that you might use to hurt yourself (examples: pills, rope, extension cords, firearm)  · Get professional help from the community (Mental Health, Substance Abuse, psychological counseling)  · Do not be alone:Call your Safe Contact- someone whom you trust who will be there for you.  · Call your local CRISIS HOTLINE 412-5062 or 449-152-7671  · Call your local Children's Mobile Crisis Response Team [place] (087) 903-1596 or www.Unda  · Call the toll free National Suicide Prevention Hotlines   · National Suicide Prevention Lifeline 131-068-TBLN (6105)  [employer] Line Network 800-SUICIDE (260-2109)    Radial Catherization Discharge Instructions      · Do not subject hand/arm to any forceful movements for 24 hours    i.e. supporting weight when rising from the chair or bed.   · Do not drive a car for 24 hours  · You may remove the dressing tomorrow  · You may shower on the day following your procedure.  Do not take a tub bath or submerge the puncture site in water for 3 days following the procedure.  · No Lifting more than 3-5 pounds with affected wrist for 5 days  · Follow up with Dr. SANCHEZ**  2-4 weeks.  · Increase fluids for 2 days post procedure.  · Continue all previous medications unless otherwise instructed.    If bleeding should occur following discharge:  · Sit down and apply firm pressure to site with your fingers for 10 minutes  · If the bleeding stops, continue to sit quietly, keeping your wrist straight for 2 hours.  Notify physician as soon as possible ( [phone])  · If bleeding does not stop after 10 minutes, or if there is  a large amount of bleeding or spurting, call 911 immediately.  Do not drive yourself to the hospital.       Medication List      ASK your doctor about these medications        Instructions    Morning Afternoon Evening Bedtime    albuterol 4 MG Tabs   Commonly known as:  PROVENTIL        Take 4 mg by mouth 2 Times a Day.   Dose:  4 mg                        anastrozole 1 MG Tabs   Commonly known as:  ARIMIDEX        Take 1 mg by mouth every day.   Dose:  1 mg                        atenolol 50 MG Tabs   Commonly known as:  TENORMIN        Doctor's comments:  Mail order pharmacy   Take 1 Tab by mouth every day.   Dose:  50 mg                        CALCIUM PO        Take  by mouth.                        * COUMADIN 7.5 MG Tabs   Generic drug:  warfarin        Take 7.5 mg by mouth every day.   Dose:  7.5 mg                        * warfarin 2.5 MG Tabs   Commonly known as:  COUMADIN                             doxazosin 2 MG Tabs   Commonly known as:  CARDURA        Take 2 mg by mouth every day.   Dose:  2 mg                        glycerin (adult) 2 GM suppository        Insert 1 Suppository in rectum Once.   Dose:  1 Suppository                        Iron 325 (65 FE) MG Tabs        Take  by mouth.                        METAMUCIL PO        Take  by mouth 2 Times a Day.                        naproxen 500 MG Tabs   Commonly known as:  NAPROSYN        Take 500 mg by mouth 2 times a day, with meals.   Dose:  500 mg                        Non Formulary Request        Stool softener HS.                        omeprazole 40 MG delayed-release capsule   Commonly known as:  PRILOSEC        Take 40 mg by mouth every day.   Dose:  40 mg                        PROZAC 20 MG Caps   Generic drug:  fluoxetine        Take 20 mg by mouth every Monday, Wednesday, and Friday.   Dose:  20 mg                        REFRESH DRY EYE THERAPY OP        by Ophthalmic route.                        SLOW- (64 MG) MG Tbcr   Generic drug:   magnesium chloride SR        Take 535 mg by mouth 2 Times a Day.   Dose:  535 mg                        * Notice:  This list has 2 medication(s) that are the same as other medications prescribed for you. Read the directions carefully, and ask your doctor or other care provider to review them with you.            Medication Information     Above and/or attached are the medications your physician expects you to take upon discharge. Review all of your home medications and newly ordered medications with your doctor and/or pharmacist. Follow medication instructions as directed by your doctor and/or pharmacist. Please keep your medication list with you and share with your physician. Update the information when medications are discontinued, doses are changed, or new medications (including over-the-counter products) are added; and carry medication information at all times in the event of emergency situations.        Resources     Quit Smoking / Tobacco Use:    I understand the use of any tobacco products increases my chance of suffering from future heart disease or stroke and could cause other illnesses which may shorten my life. Quitting the use of tobacco products is the single most important thing I can do to improve my health. For further information on smoking / tobacco cessation call a Toll Free Quit Line at 1-112.280.8177 (*National Cancer [place]) or 1-674.228.8955 (American Lung Association) or you can access the web based program at www.lungusa.org.    [place] Tobacco Users Help Line:  (921) 919-3178       Toll Free: 1-951.429.9822  Quit Tobacco Program [facility] (303)677-7042    Crisis Hotline:    [place] Crisis Hotline:  2-899-MLFHJGR or 1-286.414.5306    [place] Crisis Hotline:    1-143.993.2190 or 571-986-8543    Discharge Survey:   Thank you for choosing [facility]. We hope we did everything we could to make your hospital stay a pleasant one. You may be receiving a survey and we  would appreciate your time and participation in answering the questions. Your input is very valuable to us in our efforts to improve our service to our patients and their families.            Signatures     My signature on this form indicates that:    1. I acknowledge receipt and understanding of these Home Care Instruction.  2. My questions regarding this information have been answered to my satisfaction.  3. I have formulated a plan with my discharge nurse to obtain my prescribed medications for home.    __________________________________      __________________________________                   Patient Signature                                 Guardian/Responsible Adult Signature      __________________________________                 __________       ________                       Nurse Signature                                               Date                 Time

## 2017-04-20 NOTE — PROGRESS NOTES
Cardiology Progress Note               Author: Jayashree Saenz Date & Time created: 2017  9:50 AM     Interval History:        Admitted with :  Elective Left heart cath 2/2  new dilated CMP    HX of AFIB on OAC, CAD s/p cath  ( minimal CAD with mild plaquing in the proximal circumflex and probable mild bridging in the mid LAD, normal LV systolic function moderate dilatation of the aortic root without aortic insufficiency), cardiomyopathy, hypertension, hyperlipidemia, breast cancer on tamoxifen     Labs reviewed  INR=1.33    UQ=762/99  HR= 53    Review of Systems   Constitutional: Positive for malaise/fatigue.   Respiratory: Positive for shortness of breath. Negative for cough.    Cardiovascular: Negative for chest pain, palpitations, orthopnea, claudication, leg swelling and PND.       Physical Exam   Constitutional: He is oriented to person, place, and time.   HENT:   Head: Normocephalic.   Eyes: Conjunctivae are normal.   Neck: No JVD present. No thyromegaly present.   Pulmonary/Chest: He has no wheezes.   Abdominal: Soft.   Musculoskeletal: He exhibits no edema.   Neurological: He is alert and oriented to person, place, and time.   Skin: Skin is warm and dry.       Hemodynamics:  Temp (24hrs), Av.9 °C (98.5 °F), Min:36.9 °C (98.5 °F), Max:36.9 °C (98.5 °F)  Temperature: 36.9 °C (98.5 °F)  Pulse  Av  Min: 53  Max: 53   Blood Pressure : 131/99 mmHg     Respiratory:    Respiration: 17, Pulse Oximetry: 98 %           Fluids:        GI/Nutrition:  No orders of the defined types were placed in this encounter.     Lab Results:          Recent Labs      17   0900   INR  1.33*                     Medical Decision Making, by Problem:  Chronic atrial fibrillation  On chronic anticoagulation with warfarin  Hypertension  Hyperlipidemia  Cardiomyopathy      Plan:    Echocardiogram on 3/22/17 demonstrated new cardiomyopathy with reduced systolic function, LVEF 25-30 %, mildLV hypertrophy    Patient with  new cardiomyopathy, fatigue,  and shortness of breath    He is scheduled for left heart catheter on 4/20/17     INR= 1.33     Held Coumadin for the past 5 days     History of chronic A. Fib, rate controlled        Medications reviewed and Labs reviewed

## 2017-04-20 NOTE — DISCHARGE INSTRUCTIONS
ACTIVITY: Rest and take it easy for the first 24 hours.  A responsible adult is recommended to remain with you during that time.  It is normal to feel sleepy.  We encourage you to not do anything that requires balance, judgment or coordination.    MILD FLU-LIKE SYMPTOMS ARE NORMAL. YOU MAY EXPERIENCE GENERALIZED MUSCLE ACHES, THROAT IRRITATION, HEADACHE AND/OR SOME NAUSEA.    FOR 24 HOURS DO NOT:  Drive, operate machinery or run household appliances.  Drink beer or alcoholic beverages.   Make important decisions or sign legal documents.    SPECIAL INSTRUCTIONS: *KEEP INCISION DRY FOR 24 HRS **    DIET: To avoid nausea, slowly advance diet as tolerated, avoiding spicy or greasy foods for the first day.  Add more substantial food to your diet according to your physician's instructions.  Babies can be fed formula or breast milk as soon as they are hungry.  INCREASE FLUIDS AND FIBER TO AVOID CONSTIPATION.    SURGICAL DRESSING/BATHING: *MAY SHOWER TOMORROW AFTERNOON THEN MAY REMOVE DRESSING**    FOLLOW-UP APPOINTMENT:  A follow-up appointment should be arranged with your doctor in *DR SERRATO   [phone]**; call to schedule.    You should CALL YOUR PHYSICIAN if you develop:  Fever greater than 101 degrees F.  Pain not relieved by medication, or persistent nausea or vomiting.  Excessive bleeding (blood soaking through dressing) or unexpected drainage from the wound.  Extreme redness or swelling around the incision site, drainage of pus or foul smelling drainage.  Inability to urinate or empty your bladder within 8 hours.  Problems with breathing or chest pain.    You should call 911 if you develop problems with breathing or chest pain.  If you are unable to contact your doctor or surgical center, you should go to the nearest emergency room or urgent care center.  Physician's telephone #: *[phone]**    If any questions arise, call your doctor.  If your doctor is not available, please feel free to call the Surgical Center  at [phone].  The Center is open Monday through Friday from 7AM to 7PM.  You can also call the HEALTH HOTLINE open 24 hours/day, 7 days/week and speak to a nurse at [phone], or toll free at [phone].    A registered nurse may call you a few days after your surgery to see how you are doing after your procedure.    MEDICATIONS: Resume taking daily medication.  Take prescribed pain medication with food.  If no medication is prescribed, you may take non-aspirin pain medication if needed.  PAIN MEDICATION CAN BE VERY CONSTIPATING.  Take a stool softener or laxative such as senokot, pericolace, or milk of magnesia if needed.        If your physician has prescribed pain medication that includes Acetaminophen (Tylenol), do not take additional Acetaminophen (Tylenol) while taking the prescribed medication.    Depression / Suicide Risk    As you are discharged from this [facility] facility, it is important to learn how to keep safe from harming yourself.    Recognize the warning signs:  · Abrupt changes in personality, positive or negative- including increase in energy   · Giving away possessions  · Change in eating patterns- significant weight changes-  positive or negative  · Change in sleeping patterns- unable to sleep or sleeping all the time   · Unwillingness or inability to communicate  · Depression  · Unusual sadness, discouragement and loneliness  · Talk of wanting to die  · Neglect of personal appearance   · Rebelliousness- reckless behavior  · Withdrawal from people/activities they love  · Confusion- inability to concentrate     If you or a loved one observes any of these behaviors or has concerns about self-harm, here's what you can do:  · Talk about it- your feelings and reasons for harming yourself  · Remove any means that you might use to hurt yourself (examples: pills, rope, extension cords, firearm)  · Get professional help from the community (Mental Health, Substance Abuse, psychological  counseling)  · Do not be alone:Call your Safe Contact- someone whom you trust who will be there for you.  · Call your local CRISIS HOTLINE 127-8440 or 512-219-1772  · Call your local Children's Mobile Crisis Response Team [place] (910) 315-8717 or www.GFRANQ  · Call the toll free National Suicide Prevention Hotlines   · National Suicide Prevention Lifeline 707-978-YHSJ (2828)  [facility] Line Network 800-SUICIDE (245-6619)    Radial Catherization Discharge Instructions      · Do not subject hand/arm to any forceful movements for 24 hours    i.e. supporting weight when rising from the chair or bed.   · Do not drive a car for 24 hours  · You may remove the dressing tomorrow  · You may shower on the day following your procedure.  Do not take a tub bath or submerge the puncture site in water for 3 days following the procedure.  · No Lifting more than 3-5 pounds with affected wrist for 5 days  · Follow up with Dr. SANCHEZ**  2-4 weeks.  · Increase fluids for 2 days post procedure.  · Continue all previous medications unless otherwise instructed.    If bleeding should occur following discharge:  · Sit down and apply firm pressure to site with your fingers for 10 minutes  · If the bleeding stops, continue to sit quietly, keeping your wrist straight for 2 hours.  Notify physician as soon as possible ( [phone])  · If bleeding does not stop after 10 minutes, or if there is a large amount of bleeding or spurting, call 911 immediately.  Do not drive yourself to the hospital.

## 2017-04-20 NOTE — OR NURSING
1203   PATIENT RECEIVED FROM CATH LAB,  S/P CLEAN CATH VIA RIGHT RADIAL WRIST.   TR BAND INTACT.  SITE IS CLEAR.  NO FAMILY AT BEDSIDE.      1305   1ST 2CC OF AIR RELEASED FROM TR BAND.  SITE IS CLEAR.  PATIENT TAKING PO FLUID WELL.    1320   NEXT 3CC OF AIR RELEASED FROM TR BAND.    1335   NEXT 3CC OF AIR RELEASED FROM TR BAND.    1355  NEXT 3CC OF AIR RELEASED FROM TR BAND.    1415  NEXT 3CC OF AIR RELEASED FROM TR BAND.    1430   LAST 1CC OF AIR RELEASED FROM TR BAND.      1445   TR BAND REMOVED AND 2X2 WITH TEGADERM APPLIED.    1500  D/C INSTRUCTIONS GIVEN TO PATIENT.  NO FAMILY HERE.  PATIENT VERBALIZED UNDERSTANDING OF ALL.  HL DC.  PATIENT D/C TO HOME,  VIA WHEELCHAIR,  ESCORTED OUT BY RN.

## 2017-04-21 DIAGNOSIS — I48.91 ATRIAL FIBRILLATION, UNSPECIFIED TYPE (HCC): ICD-10-CM

## 2017-04-21 DIAGNOSIS — R93.1 DECREASED CARDIAC EJECTION FRACTION: ICD-10-CM

## 2017-04-21 DIAGNOSIS — R00.2 PALPITATIONS: Chronic | ICD-10-CM

## 2017-04-27 ENCOUNTER — OFFICE VISIT (OUTPATIENT)
Dept: CARDIOLOGY | Facility: MEDICAL CENTER | Age: 82
End: 2017-04-27
Payer: MEDICARE

## 2017-04-27 VITALS
DIASTOLIC BLOOD PRESSURE: 62 MMHG | OXYGEN SATURATION: 97 % | HEART RATE: 99 BPM | HEIGHT: 74 IN | SYSTOLIC BLOOD PRESSURE: 112 MMHG | BODY MASS INDEX: 24 KG/M2 | WEIGHT: 187 LBS

## 2017-04-27 DIAGNOSIS — I48.91 ATRIAL FIBRILLATION, UNSPECIFIED TYPE (HCC): ICD-10-CM

## 2017-04-27 DIAGNOSIS — Z79.01 CHRONIC ANTICOAGULATION: ICD-10-CM

## 2017-04-27 DIAGNOSIS — I42.9 CARDIOMYOPATHY (HCC): ICD-10-CM

## 2017-04-27 LAB — EKG IMPRESSION: NORMAL

## 2017-04-27 PROCEDURE — G8598 ASA/ANTIPLAT THER USED: HCPCS | Performed by: INTERNAL MEDICINE

## 2017-04-27 PROCEDURE — G8432 DEP SCR NOT DOC, RNG: HCPCS | Performed by: INTERNAL MEDICINE

## 2017-04-27 PROCEDURE — 1101F PT FALLS ASSESS-DOCD LE1/YR: CPT | Mod: 8P | Performed by: INTERNAL MEDICINE

## 2017-04-27 PROCEDURE — 4040F PNEUMOC VAC/ADMIN/RCVD: CPT | Mod: 8P | Performed by: INTERNAL MEDICINE

## 2017-04-27 PROCEDURE — 1036F TOBACCO NON-USER: CPT | Performed by: INTERNAL MEDICINE

## 2017-04-27 PROCEDURE — 99204 OFFICE O/P NEW MOD 45 MIN: CPT | Performed by: INTERNAL MEDICINE

## 2017-04-27 PROCEDURE — 93000 ELECTROCARDIOGRAM COMPLETE: CPT | Performed by: INTERNAL MEDICINE

## 2017-04-27 PROCEDURE — G8420 CALC BMI NORM PARAMETERS: HCPCS | Performed by: INTERNAL MEDICINE

## 2017-04-27 RX ORDER — METOPROLOL SUCCINATE 25 MG/1
50 TABLET, EXTENDED RELEASE ORAL DAILY
Qty: 60 TAB | Refills: 3 | Status: SHIPPED | OUTPATIENT
Start: 2017-04-27 | End: 2017-05-01 | Stop reason: SDUPTHER

## 2017-04-27 RX ORDER — LISINOPRIL 10 MG/1
10 TABLET ORAL DAILY
Qty: 30 TAB | Refills: 3 | Status: SHIPPED | OUTPATIENT
Start: 2017-04-27 | End: 2017-05-01 | Stop reason: SDUPTHER

## 2017-04-27 NOTE — MR AVS SNAPSHOT
"        Tej Garrett   2017 11:20 AM   Office Visit   MRN: [id number]    Department:  [facility]   Dept Phone:  [phone]    Description:  Male : 1928   Provider:  Jatinder Matthew M.D.           Reason for Visit     Follow-Up pt states he is very tired and has tingling in limbs      Allergies as of 2017     Allergen Noted Reactions    Antihistamine Decongestant [Dexbrompheniramine-Pseudoeph] 12/10/2012   Palpitations      You were diagnosed with     Cardiomyopathy (CMS-[facility])   [0466002]       Atrial fibrillation, unspecified type (CMS-[facility])   [1496642]       Chronic anticoagulation   [960527]         Vital Signs     Blood Pressure Pulse Height Weight Body Mass Index Oxygen Saturation    112/62 mmHg 99 1.892 m (6' 2.49\") 84.823 kg (187 lb) 23.70 kg/m2 97%    Smoking Status                   Former Smoker           Basic Information     Date Of Birth Sex Race Ethnicity Preferred Language    1928 Male White Non- English      Your appointments     May 02, 2017  1:30 PM   Telemedicine Clinic New Patient with [facility] EXAM 4, TELEMED [facility], TELEMED ANTICOAG VASCULAR MED   [facility]  (--)    [address] [facility] [address]   [phone]            Sep 08, 2017 10:20 AM   FOLLOW UP with Kofi Moseley M.D.   [facility] (--)    [address] [facility] [address] [phone]              Problem List              ICD-10-CM Priority Class Noted - Resolved    PAC (premature atrial contraction) (Chronic) I49.1   2009 - Present    Chest tightness or pressure (Chronic) R07.89   2009 - Present    Dyspnea (Chronic) R06.00 High  2009 - Present    Palpitations (Chronic) R00.2   2009 - Present    Atrial fibrillation (CMS-HCC) I48.91 High  12/10/2012 - Present    Chronic anticoagulation Z79.01 High  12/10/2012 - Present    CAD (coronary " artery disease) I25.10 Medium  9/8/2014 - Present    Aortic root dilatation    9/8/2014 - Present    Essential hypertension, benign I10   3/10/2017 - Present    Dyslipidemia E78.5   3/10/2017 - Present    Dilated cardiomyopathy (CMS-HCC) I42.0   3/10/2017 - Present    Abnormal echocardiogram R93.1   4/20/2017 - Present      Health Maintenance        Date Due Completion Dates    IMM DTaP/Tdap/Td Vaccine (1 - Tdap) 9/6/1947 ---    COLONOSCOPY 9/6/1978 ---    IMM ZOSTER VACCINE 9/6/1988 ---    IMM PNEUMOCOCCAL 65+ (ADULT) LOW/MEDIUM RISK SERIES (1 of 2 - PCV13) 9/6/1993 ---            Results       Current Immunizations     No immunizations on file.      Below and/or attached are the medications your provider expects you to take. Review all of your home medications and newly ordered medications with your provider and/or pharmacist. Follow medication instructions as directed by your provider and/or pharmacist. Please keep your medication list with you and share with your provider. Update the information when medications are discontinued, doses are changed, or new medications (including over-the-counter products) are added; and carry medication information at all times in the event of emergency situations     Allergies:  ANTIHISTAMINE DECONGESTANT - Palpitations               Medications  Valid as of: April 27, 2017 - 12:10 PM    Generic Name Brand Name Tablet Size Instructions for use    Albuterol Sulfate (Tab) PROVENTIL 4 MG Take 4 mg by mouth 2 Times a Day.        Anastrozole (Tab) ARIMIDEX 1 MG Take 1 mg by mouth every day.        Atenolol (Tab) TENORMIN 50 MG Take 1 Tab by mouth every day.        Calcium   Take  by mouth.        Doxazosin Mesylate (Tab) CARDURA 2 MG Take 2 mg by mouth every day.        Ferrous Sulfate (Tab) Iron 325 (65 FE) MG Take  by mouth.        FLUoxetine HCl (Cap) PROZAC 20 MG Take 20 mg by mouth every Monday, Wednesday, and Friday.        Glycerin (Laxative) (Suppos) glycerin (adult) 2 GM  Insert 1 Suppository in rectum Once.        Glycerin-Polysorbate 80   by Ophthalmic route.        Lisinopril (Tab) PRINIVIL 10 MG Take 1 Tab by mouth every day.        Magnesium Chloride (Tab CR) SLO- (64 MG) MG Take 535 mg by mouth 2 Times a Day.          Metoprolol Succinate (TABLET SR 24 HR) TOPROL XL 25 MG Take 2 Tabs by mouth every day.        Naproxen (Tab) NAPROSYN 500 MG Take 500 mg by mouth 2 times a day, with meals.          Non Formulary Request Non Formulary Request  Stool softener HS.        Omeprazole (CAPSULE DELAYED RELEASE) PRILOSEC 40 MG Take 40 mg by mouth every day.        Psyllium   Take  by mouth 2 Times a Day.        Warfarin Sodium (Tab) COUMADIN 7.5 MG Take 7.5 mg by mouth every day.        Warfarin Sodium (Tab) COUMADIN 2.5 MG         .                 Medicines prescribed today were sent to:     [facility] [address] [address]    Phone: [phone] Fax: [phone]    Open 24 Hours?: No    [facility]Y #[id number] - [place], [place] - [address] [address] [address] [facility] [address]    Phone: [phone] Fax: [phone]    Open 24 Hours?: No    [facility] - [place], [place] - [address] AT PORTAL TO REGISTERED [facility]    [address] [facility] [address]    Phone: [phone] Fax: [phone]    Open 24 Hours?: No      Medication refill instructions:       If your prescription bottle indicates you have medication refills left, it is not necessary to call your provider’s office. Please contact your pharmacy and they will refill your medication.    If your prescription bottle indicates you do not have any refills left, you may request refills at any time through one of the following ways: The online Nurego system (except Urgent Care), by calling your provider’s office, or by asking your pharmacy to contact your provider’s office with a refill request. Medication refills are processed  only during regular business hours and may not be available until the next business day. Your provider may request additional information or to have a follow-up visit with you prior to refilling your medication.   *Please Note: Medication refills are assigned a new Rx number when refilled electronically. Your pharmacy may indicate that no refills were authorized even though a new prescription for the same medication is available at the pharmacy. Please request the medicine by name with the pharmacy before contacting your provider for a refill.        Your To Do List     Future Labs/Procedures Complete By Expires    BASIC METABOLIC PANEL  5/11/2017 4/27/2018         Brandfolder Access Code: [email or web address]  Expires: 5/14/2017 11:39 AM    Brandfolder  A secure, online tool to manage your health information     [employer]’s Brandfolder® is a secure, online tool that connects you to your personalized health information from the privacy of your home -- day or night - making it very easy for you to manage your healthcare. Once the activation process is completed, you can even access your medical information using the Brandfolder deric, which is available for free in the Apple Deric store or Google Play store.     Brandfolder provides the following levels of access (as shown below):   My Chart Features   [facility]  Urgent  Care [facility]  Primary Care  Doctor   Email your healthcare team securely and privately 24/7 X X X    Manage appointments: schedule your next appointment; view details of past/upcoming appointments X      Request prescription refills. X      View recent personal medical records, including lab and immunizations X X X X   View health record, including health history, allergies, medications X X X X   Read reports about your outpatient visits, procedures, consult and ER notes X X X X   See your discharge summary, which is a recap of your hospital and/or ER visit that includes your  diagnosis, lab results, and care plan. X X       How to register for TripMark:  1. Go to  [email or web address].  2. Click on the Sign Up Now box, which takes you to the New Member Sign Up page. You will need to provide the following information:  a. Enter your TripMark Access Code exactly as it appears at the top of this page. (You will not need to use this code after you’ve completed the sign-up process. If you do not sign up before the expiration date, you must request a new code.)   b. Enter your date of birth.   c. Enter your home email address.   d. Click Submit, and follow the next screen’s instructions.  3. Create a Inofilet ID. This will be your TripMark login ID and cannot be changed, so think of one that is secure and easy to remember.  4. Create a Inofilet password. You can change your password at any time.  5. Enter your Password Reset Question and Answer. This can be used at a later time if you forget your password.   6. Enter your e-mail address. This allows you to receive e-mail notifications when new information is available in TripMark.  7. Click Sign Up. You can now view your health information.    For assistance activating your TripMark account, call [phone]

## 2017-04-27 NOTE — PROGRESS NOTES
Arrhythmia Clinic Note (New patient)     DOS: 4/27/2017    Referring physician: Kofi Moseley    Chief complaint/Reason for consult: Severely depressed LVEF consideration for ICD    HPI:  Patient is an 89 yo male with history of long standing AF on coumadin. He has rare palpitations but is largely asymptomatic from his AF. He sees Kofi Moseley on a regular basis. Recently has started developing class II/III HF symptoms. EF was checked which was found to be severely depressed. He underwent ischmic evaluation including cardiac catheterization which showed minimal CAD. He is here as a referral for consideration of ICD therapy.    ROS (+ highlighted in red):  Constitutional: Fevers/chills/fatigue/weightloss  HEENT: Blurry vision/eye pain/sore throat/hearing loss  Respiratory: Shortness of breath/cough  Cardiovascular: Chest pain/palpitations/edema/orthopnea/syncope  GI: Nausea/vomitting/diarrhea  MSK: Arthralgias/myagias/muscle weakness  Skin: Rash/sores  Neurological: Numbness/tremors/vertigo  Endocrine: Excessive thirst/polyuria/cold intolerance/heat intolerance  Psych: Depression/anxiety    Past Medical History   Diagnosis Date   • Arthritis    • HTN (hypertension)    • prostate      TURP   • depression    • Asthma    • PAC (premature atrial contraction) 11/23/2009   • Benign essential hypertension 11/23/2009   • Chest tightness or pressure 11/23/2009   • Dyspnea 11/23/2009   • Palpitations 11/23/2009   • Breath shortness    • Cataract    • Pain      Back and hip.   • Cancer (CMS-HCC) ?2014     Bladder and breast.   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   • Arrhythmia      A-fib.   • Bowel habit changes      Constipation   • Dental disorder      Lower dentures.   • Pneumonia      Around 2010.       Past Surgical History   Procedure Laterality Date   • Jennifer by laparoscopy  8/8/2010     Performed by [facility]ANUEL GARCIA at [facility]   • Umbilical hernia repair  8/8/2010      Performed by IMMANUEL GARCIA at [facility]   • Other       Bladder tumor removed   • Other  ?2012     Left breast cancer (no nipple).       Social History     Social History   • Marital Status: Single     Spouse Name: N/A   • Number of Children: N/A   • Years of Education: N/A     Occupational History   • Not on file.     Social History Main Topics   • Smoking status: Former Smoker -- 2.00 packs/day for 30 years     Quit date: 06/01/1998   • Smokeless tobacco: Never Used      Comment: Quit 10years ago   • Alcohol Use: Yes      Comment: One glass a wine daily. 1-2/daily and sometimes whiskey and/or beer.   • Drug Use: No   • Sexual Activity: Not on file     Other Topics Concern   • Not on file     Social History Narrative       History reviewed. No pertinent family history.    Allergies   Allergen Reactions   • Antihistamine Decongestant [Dexbrompheniramine-Pseudoeph] Palpitations       Current Outpatient Prescriptions   Medication Sig Dispense Refill   • metoprolol SR (TOPROL XL) 25 MG TABLET SR 24 HR Take 2 Tabs by mouth every day. 60 Tab 3   • lisinopril (PRINIVIL) 10 MG Tab Take 1 Tab by mouth every day. 30 Tab 3   • Psyllium (METAMUCIL PO) Take  by mouth 2 Times a Day.     • Non Formulary Request Stool softener HS.     • atenolol (TENORMIN) 50 MG Tab Take 1 Tab by mouth every day. 30 Tab 11   • warfarin (COUMADIN) 2.5 MG Tab   0   • CALCIUM PO Take  by mouth.     • Glycerin, Laxative, (GLYCERIN, ADULT,) 2 GM suppository Insert 1 Suppository in rectum Once.     • omeprazole (PRILOSEC) 40 MG capsule Take 40 mg by mouth every day.     • anastrozole (ARIMIDEX) 1 MG TABS Take 1 mg by mouth every day.     • Ferrous Sulfate (IRON) 325 (65 FE) MG TABS Take  by mouth.     • naproxen (NAPROSYN) 500 MG TABS Take 500 mg by mouth 2 times a day, with meals.       • magnesium chloride SR (SLOW-MAG) 535 (64 MG) MG TBCR Take 535 mg by mouth 2 Times a Day.       • doxazosin (CARDURA) 2 MG TABS Take 2 mg by mouth  "every day.     • albuterol (PROVENTIL) 4 MG TABS Take 4 mg by mouth 2 Times a Day.     • warfarin (COUMADIN) 7.5 MG TABS Take 7.5 mg by mouth every day.     • fluoxetine (PROZAC) 20 MG CAPS Take 20 mg by mouth every Monday, Wednesday, and Friday.     • Glycerin-Polysorbate 80 (REFRESH DRY EYE THERAPY OP) by Ophthalmic route.       No current facility-administered medications for this visit.       Physical Exam:  Filed Vitals:    04/27/17 1014   BP: 112/62   Pulse: 99   Height: 1.892 m (6' 2.49\")   Weight: 84.823 kg (187 lb)   SpO2: 97%     General appearance: NAD, conversant   Eyes: anicteric sclerae, moist conjunctivae; no lid-lag; PERRLA  HENT: Atraumatic; oropharynx clear with moist mucous membranes and no mucosal ulcerations; normal hard and soft palate  Neck: Trachea midline; FROM, supple, no thyromegaly or lymphadenopathy  Lungs: CTA, with normal respiratory effort and no intercostal retractions  CV: RRR, no MRGs, no JVD   Abdomen: Soft, non-tender; no masses or HSM  Extremities: No peripheral edema or extremity lymphadenopathy  Skin: Normal temperature, turgor and texture; no rash, ulcers or subcutaneous nodules  Psych: Appropriate affect, alert and oriented to person, place and time    Data:  Labs reviewed    Prior echo/stress results reviewed:  LVEF severely depressed    EKG interpreted by me:  AF, RBBB    Impression/Plan:  1. Cardiomyopathy (CMS-HCC)  EKG    metoprolol SR (TOPROL XL) 25 MG TABLET SR 24 HR    BASIC METABOLIC PANEL    lisinopril (PRINIVIL) 10 MG Tab   2. Atrial fibrillation, unspecified type (CMS-HCC)  metoprolol SR (TOPROL XL) 25 MG TABLET SR 24 HR    BASIC METABOLIC PANEL    lisinopril (PRINIVIL) 10 MG Tab   3. Chronic anticoagulation  metoprolol SR (TOPROL XL) 25 MG TABLET SR 24 HR    BASIC METABOLIC PANEL    lisinopril (PRINIVIL) 10 MG Tab     -I do not think an ICD is currently indicated in Mr. Garrett  -He has not been on a trial of good medical therapy including evidenced based BB or " ACEI  -Furthermore he is 88, and a primary prevention ICD is questionable in this age group given his limited life expectancy  -His AF otherwise is chronic and he is anticoagulated and rate controlled  -I will put him on Toprol and Lisinopril (watch his Cr) and have him follow-up with Dr. Moseley for titration    Jatinder Matthew MD

## 2017-04-28 DIAGNOSIS — Z79.01 CHRONIC ANTICOAGULATION: ICD-10-CM

## 2017-04-28 DIAGNOSIS — I42.9 CARDIOMYOPATHY (HCC): ICD-10-CM

## 2017-04-28 DIAGNOSIS — I48.91 ATRIAL FIBRILLATION, UNSPECIFIED TYPE (HCC): ICD-10-CM

## 2017-04-28 RX ORDER — METOPROLOL SUCCINATE 25 MG/1
50 TABLET, EXTENDED RELEASE ORAL DAILY
Qty: 180 TAB | Refills: 0 | Status: CANCELLED | OUTPATIENT
Start: 2017-04-28

## 2017-04-28 RX ORDER — LISINOPRIL 10 MG/1
10 TABLET ORAL DAILY
Qty: 90 TAB | Refills: 0 | Status: CANCELLED | OUTPATIENT
Start: 2017-04-28

## 2017-05-01 ENCOUNTER — TELEPHONE (OUTPATIENT)
Dept: CARDIOLOGY | Facility: MEDICAL CENTER | Age: 82
End: 2017-05-01

## 2017-05-01 DIAGNOSIS — I48.91 ATRIAL FIBRILLATION, UNSPECIFIED TYPE (HCC): ICD-10-CM

## 2017-05-01 DIAGNOSIS — I42.9 CARDIOMYOPATHY (HCC): ICD-10-CM

## 2017-05-01 DIAGNOSIS — Z79.01 CHRONIC ANTICOAGULATION: ICD-10-CM

## 2017-05-01 RX ORDER — LISINOPRIL 10 MG/1
10 TABLET ORAL DAILY
Qty: 90 TAB | Refills: 3 | Status: SHIPPED | OUTPATIENT
Start: 2017-05-01 | End: 2018-02-27 | Stop reason: SDUPTHER

## 2017-05-01 RX ORDER — METOPROLOL SUCCINATE 25 MG/1
50 TABLET, EXTENDED RELEASE ORAL DAILY
Qty: 180 TAB | Refills: 3 | Status: SHIPPED | OUTPATIENT
Start: 2017-05-01 | End: 2018-02-27 | Stop reason: SDUPTHER

## 2017-05-01 NOTE — TELEPHONE ENCOUNTER
Spoke with pt. Dr. Matthew actually sent refills of Metoprolol and Lisinopril to [facility] in [place], not to [facility]. Pt. Will  1 month refills at [facility]. Nurse sent refills to [facility] electronically.

## 2017-05-01 NOTE — TELEPHONE ENCOUNTER
----- Message from Karen Mejia sent at 5/1/2017 10:13 AM PDT -----  Regarding: Patient wants 1 month supply of prescriptions  MAGALIS/Qian    Patient wants a call back at [phone]. He wants to get a 1 month supply of Lisinopril and Metoprolol sent to a local pharmacy.

## 2017-05-02 DIAGNOSIS — I48.91 ATRIAL FIBRILLATION, UNSPECIFIED TYPE (HCC): ICD-10-CM

## 2017-05-16 LAB — INR PPP: 2.6 (ref 2–3.5)

## 2017-05-17 ENCOUNTER — ANTICOAGULATION MONITORING (OUTPATIENT)
Dept: VASCULAR LAB | Facility: MEDICAL CENTER | Age: 82
End: 2017-05-17

## 2017-05-17 DIAGNOSIS — I48.91 ATRIAL FIBRILLATION, UNSPECIFIED TYPE (HCC): ICD-10-CM

## 2017-05-17 NOTE — PROGRESS NOTES
Anticoagulation Summary as of 5/17/2017     INR goal 2.0-3.0   Selected INR 2.6 (5/16/2017)   Maintenance plan 0 mg on Sun; 7.5 mg (7.5 mg x 1) all other days   Weekly total 45 mg   Plan last modified Herbie Rebollar PHARMD (5/17/2017)   Next INR check 6/14/2017   Target end date Indefinite    Indications   Atrial fibrillation (CMS-HCC) [I48.91]         Anticoagulation Episode Summary     INR check location     Preferred lab     Send INR reminders to     Comments       Anticoagulation Care Providers     Provider Role Specialty Phone number    Kofi Moseley M.D. Referring Cardiology [phone]    [facility] Responsible  [phone]    Herbie Rebollar PHARMD Responsible          Current Outpatient Prescriptions on File Prior to Visit   Medication Sig Dispense Refill   • metoprolol SR (TOPROL XL) 25 MG TABLET SR 24 HR Take 2 Tabs by mouth every day. 180 Tab 3   • lisinopril (PRINIVIL) 10 MG Tab Take 1 Tab by mouth every day. 90 Tab 3   • Psyllium (METAMUCIL PO) Take  by mouth 2 Times a Day.     • Non Formulary Request Stool softener HS.     • atenolol (TENORMIN) 50 MG Tab Take 1 Tab by mouth every day. 30 Tab 11   • CALCIUM PO Take  by mouth.     • Glycerin, Laxative, (GLYCERIN, ADULT,) 2 GM suppository Insert 1 Suppository in rectum Once.     • omeprazole (PRILOSEC) 40 MG capsule Take 40 mg by mouth every day.     • anastrozole (ARIMIDEX) 1 MG TABS Take 1 mg by mouth every day.     • Ferrous Sulfate (IRON) 325 (65 FE) MG TABS Take  by mouth.     • naproxen (NAPROSYN) 500 MG TABS Take 500 mg by mouth 2 times a day, with meals.       • magnesium chloride SR (SLOW-MAG) 535 (64 MG) MG TBCR Take 535 mg by mouth 2 Times a Day.       • doxazosin (CARDURA) 2 MG TABS Take 2 mg by mouth every day.     • albuterol (PROVENTIL) 4 MG TABS Take 4 mg by mouth 2 Times a Day.     • warfarin (COUMADIN) 7.5 MG TABS Take 7.5 mg by mouth every day.     • Glycerin-Polysorbate 80 (REFRESH DRY EYE THERAPY OP) by  Ophthalmic route.     • fluoxetine (PROZAC) 20 MG CAPS Take 20 mg by mouth every Monday, Wednesday, and Friday.       No current facility-administered medications on file prior to visit.     Lab Results   Component Value Date/Time    SODIUM 141 04/14/2017 12:10 PM    POTASSIUM 4.6 04/14/2017 12:10 PM    CHLORIDE 105 04/14/2017 12:10 PM    CO2 31 04/14/2017 12:10 PM    GLUCOSE 88 04/14/2017 12:10 PM    BUN 24* 04/14/2017 12:10 PM    CREATININE 1.34 04/14/2017 12:10 PM      Past Medical History   Diagnosis Date   • Arthritis    • HTN (hypertension)    • prostate      TURP   • depression    • Asthma    • PAC (premature atrial contraction) 11/23/2009   • Benign essential hypertension 11/23/2009   • Chest tightness or pressure 11/23/2009   • Dyspnea 11/23/2009   • Palpitations 11/23/2009   • Breath shortness    • Cataract    • Pain      Back and hip.   • Cancer (CMS-HCC) ?2014     Bladder and breast.   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   • Arrhythmia      A-fib.   • Bowel habit changes      Constipation   • Dental disorder      Lower dentures.   • Pneumonia      Around 2010.     Anticoagulation Patient Findings    Pt is new to warfarin and new to [facility].  Discussed indication for warfarin therapy and INR goal range. Explained our services, hours of operation, warfarin therapy, potential SE, potential DI.. Discussed diet at length, with an emphasis on foods rich in vitamin K.  Discussed monitoring parameters, such as blood in urine, blood in stool, discussed what to do if a dose is missed, or suspected as missed.  Emphasized importance of compliance.  Discussed lifestyle choices of ETOH & smoking and its impact on therapy.  Added [facility] to care team    Patient referred to clinic by Dr. Moseley for history of nonvalvular atrial fibrillation.  [id number] = at least 4 (>74 y/o, CAD, HTN).  He has been taking warfarin for quite some time and previously managed by PCP,  Dr. Dallas  Patient denies history of VTE or stroke.  Verified current warfarin regimen.  Med rec completed.  INR therapeutic at 2.6.  Pt is to continue with current warfarin dosing regimen.  Follow up in 4 weeks per patient preference.    Herbie Rebollar, PHARMD    CC Dr Michael Bloch

## 2017-05-18 ENCOUNTER — TELEPHONE (OUTPATIENT)
Dept: VASCULAR LAB | Facility: MEDICAL CENTER | Age: 82
End: 2017-05-18

## 2017-05-18 DIAGNOSIS — Z79.01 CHRONIC ANTICOAGULATION: ICD-10-CM

## 2017-05-18 DIAGNOSIS — I42.9 CARDIOMYOPATHY (HCC): ICD-10-CM

## 2017-05-18 DIAGNOSIS — I48.91 ATRIAL FIBRILLATION, UNSPECIFIED TYPE (HCC): ICD-10-CM

## 2017-05-18 NOTE — TELEPHONE ENCOUNTER
Initial anticoag note reviewed.  Will continue indefnite anticaogulation as recommended by cards for afib with chads-vasc = at least 4.    Michael Bloch, MD  Anticoagulation Clinic    Cc:  JIL Dallas

## 2017-05-19 ENCOUNTER — OFFICE VISIT (OUTPATIENT)
Dept: CARDIOLOGY | Facility: PHYSICIAN GROUP | Age: 82
End: 2017-05-19
Payer: MEDICARE

## 2017-05-19 VITALS
DIASTOLIC BLOOD PRESSURE: 72 MMHG | SYSTOLIC BLOOD PRESSURE: 116 MMHG | HEART RATE: 76 BPM | WEIGHT: 178.6 LBS | BODY MASS INDEX: 22.21 KG/M2 | HEIGHT: 75 IN

## 2017-05-19 DIAGNOSIS — I10 ESSENTIAL HYPERTENSION, BENIGN: ICD-10-CM

## 2017-05-19 DIAGNOSIS — E78.5 DYSLIPIDEMIA: ICD-10-CM

## 2017-05-19 DIAGNOSIS — I48.20 CHRONIC ATRIAL FIBRILLATION (HCC): ICD-10-CM

## 2017-05-19 DIAGNOSIS — I42.0 DILATED CARDIOMYOPATHY (HCC): ICD-10-CM

## 2017-05-19 DIAGNOSIS — I65.23 BILATERAL CAROTID ARTERY STENOSIS: ICD-10-CM

## 2017-05-19 DIAGNOSIS — Z79.01 CHRONIC ANTICOAGULATION: ICD-10-CM

## 2017-05-19 PROBLEM — I65.29 CAROTID ARTERY STENOSIS: Status: ACTIVE | Noted: 2017-05-19

## 2017-05-19 PROCEDURE — 4040F PNEUMOC VAC/ADMIN/RCVD: CPT | Mod: 8P | Performed by: INTERNAL MEDICINE

## 2017-05-19 PROCEDURE — 1036F TOBACCO NON-USER: CPT | Performed by: INTERNAL MEDICINE

## 2017-05-19 PROCEDURE — G8420 CALC BMI NORM PARAMETERS: HCPCS | Performed by: INTERNAL MEDICINE

## 2017-05-19 PROCEDURE — 99214 OFFICE O/P EST MOD 30 MIN: CPT | Performed by: INTERNAL MEDICINE

## 2017-05-19 PROCEDURE — 1101F PT FALLS ASSESS-DOCD LE1/YR: CPT | Mod: 8P | Performed by: INTERNAL MEDICINE

## 2017-05-19 PROCEDURE — G8598 ASA/ANTIPLAT THER USED: HCPCS | Performed by: INTERNAL MEDICINE

## 2017-05-19 PROCEDURE — G8432 DEP SCR NOT DOC, RNG: HCPCS | Performed by: INTERNAL MEDICINE

## 2017-05-19 ASSESSMENT — ENCOUNTER SYMPTOMS
FEVER: 0
CHILLS: 0
MYALGIAS: 0
ORTHOPNEA: 0
DIZZINESS: 0
PND: 0
SHORTNESS OF BREATH: 1
INSOMNIA: 0
ABDOMINAL PAIN: 0
BLURRED VISION: 0
PALPITATIONS: 0
LOSS OF CONSCIOUSNESS: 0

## 2017-05-19 NOTE — PROGRESS NOTES
Subjective:   Tej Garrett is a 88 y.o. male who presents today for follow up of dilated cardiomyopathy, atrial fibrillation on chronic anticoagulation therapy.    Since the patient's last visit on 04/27/17 with Laura Goins, he has continued to experience fatigue and shortness of breath. He denies chest pain, palpitations, nausea/vomiting or diaphoresis. He underwent cardiac catheterization as described. He was not recommended for AICD.    Past Medical History   Diagnosis Date   • Arthritis    • HTN (hypertension)    • prostate      TURP   • depression    • Asthma    • PAC (premature atrial contraction) 11/23/2009   • Benign essential hypertension 11/23/2009   • Chest tightness or pressure 11/23/2009   • Dyspnea 11/23/2009   • Palpitations 11/23/2009   • Breath shortness    • Cataract    • Pain      Back and hip.   • Cancer (CMS-HCC) ?2014     Bladder and breast.   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   • Arrhythmia      A-fib.   • Bowel habit changes      Constipation   • Dental disorder      Lower dentures.   • Pneumonia      Around 2010.     Past Surgical History   Procedure Laterality Date   • Jennifer by laparoscopy  8/8/2010     Performed by IMMANUEL GARCIA at [facility]ERY [facility]   • Umbilical hernia repair  8/8/2010     Performed by IMMANUEL GARCIA at [facility]   • Other       Bladder tumor removed   • Other  ?2012     Left breast cancer (no nipple).     History reviewed. No pertinent family history.  History   Smoking status   • Former Smoker -- 2.00 packs/day for 30 years   • Quit date: 06/01/1998   Smokeless tobacco   • Never Used     Comment: Quit 10years ago     Allergies   Allergen Reactions   • Antihistamine Decongestant [Dexbrompheniramine-Pseudoeph] Palpitations     Medications reviewed.    Outpatient Encounter Prescriptions as of 5/19/2017   Medication Sig Dispense Refill   • metoprolol SR (TOPROL XL) 25 MG TABLET SR 24 HR Take 2 Tabs by  mouth every day. 180 Tab 3   • lisinopril (PRINIVIL) 10 MG Tab Take 1 Tab by mouth every day. 90 Tab 3   • Psyllium (METAMUCIL PO) Take  by mouth 2 Times a Day.     • Non Formulary Request Stool softener HS.     • CALCIUM PO Take  by mouth.     • Glycerin, Laxative, (GLYCERIN, ADULT,) 2 GM suppository Insert 1 Suppository in rectum Once.     • omeprazole (PRILOSEC) 40 MG capsule Take 40 mg by mouth every day.     • anastrozole (ARIMIDEX) 1 MG TABS Take 1 mg by mouth every day.     • Ferrous Sulfate (IRON) 325 (65 FE) MG TABS Take  by mouth.     • naproxen (NAPROSYN) 500 MG TABS Take 500 mg by mouth 2 times a day, with meals.       • magnesium chloride SR (SLOW-MAG) 535 (64 MG) MG TBCR Take 535 mg by mouth 2 Times a Day.       • doxazosin (CARDURA) 2 MG TABS Take 2 mg by mouth every day.     • albuterol (PROVENTIL) 4 MG TABS Take 4 mg by mouth 2 Times a Day.     • warfarin (COUMADIN) 7.5 MG TABS Take 7.5 mg by mouth every day.     • Glycerin-Polysorbate 80 (REFRESH DRY EYE THERAPY OP) by Ophthalmic route.     • fluoxetine (PROZAC) 20 MG CAPS Take 20 mg by mouth every Monday, Wednesday, and Friday.     • atenolol (TENORMIN) 50 MG Tab Take 1 Tab by mouth every day. 30 Tab 11     No facility-administered encounter medications on file as of 5/19/2017.     Review of Systems   Constitutional: Positive for malaise/fatigue. Negative for fever and chills.   HENT: Negative for congestion.         Runny eyes.   Eyes: Negative for blurred vision.   Respiratory: Positive for shortness of breath.    Cardiovascular: Negative for chest pain, palpitations, orthopnea, leg swelling and PND.   Gastrointestinal: Negative for abdominal pain.   Genitourinary: Negative for dysuria.   Musculoskeletal: Negative for myalgias and joint pain.   Skin: Negative for rash.   Neurological: Negative for dizziness and loss of consciousness.   Psychiatric/Behavioral: The patient does not have insomnia.         Objective:   /72 mmHg  Pulse 76  Ht  "1.905 m (6' 3\")  Wt 81.012 kg (178 lb 9.6 oz)  BMI 22.32 kg/m2    Physical Exam   Constitutional: He is oriented to person, place, and time. He appears well-developed and well-nourished.   HENT:   Head: Normocephalic and atraumatic.   Eyes: Conjunctivae are normal. Pupils are equal, round, and reactive to light.   Neck: Normal range of motion. Neck supple.   Cardiovascular: Normal rate.  An irregularly irregular rhythm present.   Pulmonary/Chest: Effort normal and breath sounds normal.   Abdominal: Soft. Bowel sounds are normal.   Musculoskeletal: Normal range of motion. He exhibits no edema.   Neurological: He is alert and oriented to person, place, and time.   Skin: Skin is warm and dry.   Psychiatric: He has a normal mood and affect.     CARDIAC STUDIES/PROCEDURES:    CARDIAC CATHETERIZATION CONCLUSIONS (04/20/17)  1.  No angiographic evidence of coronary artery disease.  2.  Severely reduced left ventricular systolic function with ejection fraction of 20%.  3.  Elevated left ventricular end-diastolic pressure.    CARDIAC CATHETERIZATION CONCLUSIONS by Dr. Dalal (08/05/10)  1. Minimal coronary artery disease with mild plaquing in the   proximal circumflex and probable mild bridging in the mid left   anterior descending.   2. Normal left ventricular systolic function.   3. Systemic hypertension.   4. Moderate dilatation of the aortic root without aortic insufficiency.    CAROTID ULTRASOUND (02/13/17)  Carotid ultrasound showing moderate stenosis.    CT OF CHEST (11/01/13)  Moderate atherosclerosis with coronary involvement.    ECHOCARDIOGRAM CONCLUSIONS (07/29/13)  Echocardiogram showing ejection fraction of 45%, mild mitral regurgitation and stenosis. The ascending aorta was not well visualized.    ECHOCARDIOGRAM CONCLUSIONS (07/29/13)  Echocardiogram showing ejection fraction of 50-55%, no significant valvular abnormalities. There is mild dilation of ascending aorta.    EKG performed on (04/27/17) was " reviewed: EKG shows atrial fibrillation with right bundle branch block and frequent premature ventricular contractions.  EKG performed on (03/28/13): EKG shows atrial fibrillation with right bundle branch block.    Laboratory results of (10/27/12). Cholesterol profile of 160/65/49/98 noted.    UPPER EXTREMITY ARTERIAL ULTRASOUND  Upper extremity arterial study showing no hemodynamically significant stenosis is demonstrated involving either upper arterial system.    UPPER  EXTREMITY VENOUS ULTRASOUND (02/13/17)  Bilateral upper extremity venous study showing no evidence of deep venous thrombosis.    Assessment:     Patient Active Problem List    Diagnosis Date Noted   • Atrial fibrillation/Chronic anticoagulation 12/10/2012     Priority: High   • Dilated cardiomyopathy 12/10/2012     Priority: High   • Aortic root dilatation 09/08/2014     Priority: Medium   • Hyperlipidemia 09/08/2014     Priority: Low   • Hypertension 09/08/2014     Priority: Low   • Carotid artery stenosis 09/08/2014     Medical Decision Making:  Today's Assessment / Status / Plan:     1. Atrial fibrillation on anticoagulation therapy (warfarin): He is experiencing fatigue and shortness of breath.   2. Dilated cardiomyopathy: His volume is adequate. He was not recommended for AICD per Dr. Matthew. We will repeat an echocardiogram in one year.  3. Ascending aorta dilatio : He remains clinically stable. We will continue with current medical care. Plan as above.  4. Hypertension: Blood pressure is well controlled.  5. Hyperlipidemia: Stable on strict diet and exercise therapy. We will repeat labs including fasting lipid profile in one year.  6. Carotid artery stenosis: Clinically stable on medical therapy. We will repeat a carotid ultrasound in one year.  7. Breast cancer treated with Tamoxifen.    We will follow up in one year with labs, echocardiogram and carotid ultrasound.    CC Alejandro Barrera

## 2017-05-19 NOTE — Clinical Note
[facility] [address]  Phone: [phone]  Fax: [phone]              Tej Garrett  9/6/1928    Encounter Date: 5/19/2017    Kofi Moseley M.D.          PROGRESS NOTE:  Subjective:   Tej Garrett is a 88 y.o. male who presents today for follow up of dilated cardiomyopathy, atrial fibrillation on chronic anticoagulation therapy.    Since the patient's last visit on 04/27/17 with Laura Goins, he has continued to experience fatigue and shortness of breath. He denies chest pain, palpitations, nausea/vomiting or diaphoresis. He underwent cardiac catheterization as described. He was not recommended for AICD.    Past Medical History   Diagnosis Date   • Arthritis    • HTN (hypertension)    • prostate      TURP   • depression    • Asthma    • PAC (premature atrial contraction) 11/23/2009   • Benign essential hypertension 11/23/2009   • Chest tightness or pressure 11/23/2009   • Dyspnea 11/23/2009   • Palpitations 11/23/2009   • Breath shortness    • Cataract    • Pain      Back and hip.   • Cancer (CMS-HCC) ?2014     Bladder and breast.   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   • Arrhythmia      A-fib.   • Bowel habit changes      Constipation   • Dental disorder      Lower dentures.   • Pneumonia      Around 2010.     Past Surgical History   Procedure Laterality Date   • Jennifer by laparoscopy  8/8/2010     Performed by IMMANUEL GARCIA at SUR[facility]ERY [facility]   • Umbilical hernia repair  8/8/2010     Performed by IMMANUEL GARCIA at [facility]   • Other       Bladder tumor removed   • Other  ?2012     Left breast cancer (no nipple).     History reviewed. No pertinent family history.  History   Smoking status   • Former Smoker -- 2.00 packs/day for 30 years   • Quit date: 06/01/1998   Smokeless tobacco   • Never Used     Comment: Quit 10years ago     Allergies      Allergen Reactions   • Antihistamine Decongestant [Dexbrompheniramine-Pseudoeph] Palpitations     Medications reviewed.    Outpatient Encounter Prescriptions as of 5/19/2017   Medication Sig Dispense Refill   • metoprolol SR (TOPROL XL) 25 MG TABLET SR 24 HR Take 2 Tabs by mouth every day. 180 Tab 3   • lisinopril (PRINIVIL) 10 MG Tab Take 1 Tab by mouth every day. 90 Tab 3   • Psyllium (METAMUCIL PO) Take  by mouth 2 Times a Day.     • Non Formulary Request Stool softener HS.     • CALCIUM PO Take  by mouth.     • Glycerin, Laxative, (GLYCERIN, ADULT,) 2 GM suppository Insert 1 Suppository in rectum Once.     • omeprazole (PRILOSEC) 40 MG capsule Take 40 mg by mouth every day.     • anastrozole (ARIMIDEX) 1 MG TABS Take 1 mg by mouth every day.     • Ferrous Sulfate (IRON) 325 (65 FE) MG TABS Take  by mouth.     • naproxen (NAPROSYN) 500 MG TABS Take 500 mg by mouth 2 times a day, with meals.       • magnesium chloride SR (SLOW-MAG) 535 (64 MG) MG TBCR Take 535 mg by mouth 2 Times a Day.       • doxazosin (CARDURA) 2 MG TABS Take 2 mg by mouth every day.     • albuterol (PROVENTIL) 4 MG TABS Take 4 mg by mouth 2 Times a Day.     • warfarin (COUMADIN) 7.5 MG TABS Take 7.5 mg by mouth every day.     • Glycerin-Polysorbate 80 (REFRESH DRY EYE THERAPY OP) by Ophthalmic route.     • fluoxetine (PROZAC) 20 MG CAPS Take 20 mg by mouth every Monday, Wednesday, and Friday.     • atenolol (TENORMIN) 50 MG Tab Take 1 Tab by mouth every day. 30 Tab 11     No facility-administered encounter medications on file as of 5/19/2017.     Review of Systems   Constitutional: Positive for malaise/fatigue. Negative for fever and chills.   HENT: Negative for congestion.         Runny eyes.   Eyes: Negative for blurred vision.   Respiratory: Positive for shortness of breath.    Cardiovascular: Negative for chest pain, palpitations, orthopnea, leg swelling and PND.   Gastrointestinal: Negative for abdominal pain.   Genitourinary: Negative  "for dysuria.   Musculoskeletal: Negative for myalgias and joint pain.   Skin: Negative for rash.   Neurological: Negative for dizziness and loss of consciousness.   Psychiatric/Behavioral: The patient does not have insomnia.         Objective:   /72 mmHg  Pulse 76  Ht 1.905 m (6' 3\")  Wt 81.012 kg (178 lb 9.6 oz)  BMI 22.32 kg/m2    Physical Exam   Constitutional: He is oriented to person, place, and time. He appears well-developed and well-nourished.   HENT:   Head: Normocephalic and atraumatic.   Eyes: Conjunctivae are normal. Pupils are equal, round, and reactive to light.   Neck: Normal range of motion. Neck supple.   Cardiovascular: Normal rate.  An irregularly irregular rhythm present.   Pulmonary/Chest: Effort normal and breath sounds normal.   Abdominal: Soft. Bowel sounds are normal.   Musculoskeletal: Normal range of motion. He exhibits no edema.   Neurological: He is alert and oriented to person, place, and time.   Skin: Skin is warm and dry.   Psychiatric: He has a normal mood and affect.     CARDIAC STUDIES/PROCEDURES:    CARDIAC CATHETERIZATION CONCLUSIONS (04/20/17)  1.  No angiographic evidence of coronary artery disease.  2.  Severely reduced left ventricular systolic function with ejection fraction of 20%.  3.  Elevated left ventricular end-diastolic pressure.    CARDIAC CATHETERIZATION CONCLUSIONS by Dr. Dalal (08/05/10)  1. Minimal coronary artery disease with mild plaquing in the   proximal circumflex and probable mild bridging in the mid left   anterior descending.   2. Normal left ventricular systolic function.   3. Systemic hypertension.   4. Moderate dilatation of the aortic root without aortic insufficiency.    CAROTID ULTRASOUND (02/13/17)  Carotid ultrasound showing moderate stenosis.    CT OF CHEST (11/01/13)  Moderate atherosclerosis with coronary involvement.    ECHOCARDIOGRAM CONCLUSIONS (07/29/13)  Echocardiogram showing ejection fraction of 45%, mild mitral regurgitation " and stenosis. The ascending aorta was not well visualized.    ECHOCARDIOGRAM CONCLUSIONS (07/29/13)  Echocardiogram showing ejection fraction of 50-55%, no significant valvular abnormalities. There is mild dilation of ascending aorta.    EKG performed on (04/27/17) was reviewed: EKG shows atrial fibrillation with right bundle branch block and frequent premature ventricular contractions.  EKG performed on (03/28/13): EKG shows atrial fibrillation with right bundle branch block.    Laboratory results of (10/27/12). Cholesterol profile of 160/65/49/98 noted.    UPPER EXTREMITY ARTERIAL ULTRASOUND  Upper extremity arterial study showing no hemodynamically significant stenosis is demonstrated involving either upper arterial system.    UPPER  EXTREMITY VENOUS ULTRASOUND (02/13/17)  Bilateral upper extremity venous study showing no evidence of deep venous thrombosis.    Assessment:     Patient Active Problem List    Diagnosis Date Noted   • Atrial fibrillation/Chronic anticoagulation 12/10/2012     Priority: High   • Dilated cardiomyopathy 12/10/2012     Priority: High   • Aortic root dilatation 09/08/2014     Priority: Medium   • Hyperlipidemia 09/08/2014     Priority: Low   • Hypertension 09/08/2014     Priority: Low   • Carotid artery stenosis 09/08/2014     Medical Decision Making:  Today's Assessment / Status / Plan:     1. Atrial fibrillation on anticoagulation therapy (warfarin): He is experiencing fatigue and shortness of breath.   2. Dilated cardiomyopathy: His volume is adequate. He was not recommended for AICD per Dr. Matthew. We will repeat an echocardiogram in one year.  3. Ascending aorta dilatio : He remains clinically stable. We will continue with current medical care. Plan as above.  4. Hypertension: Blood pressure is well controlled.  5. Hyperlipidemia: Stable on strict diet and exercise therapy. We will repeat labs including fasting lipid profile in one year.  6. Carotid artery stenosis: Clinically stable on  medical therapy. We will repeat a carotid ultrasound in one year.  7. Breast cancer treated with Tamoxifen.    We will follow up in one year with labs, echocardiogram and carotid ultrasound.    CC Alejandro Barrera        No Recipients

## 2017-06-14 DIAGNOSIS — E78.5 DYSLIPIDEMIA: ICD-10-CM

## 2017-06-22 DIAGNOSIS — I65.23 BILATERAL CAROTID ARTERY STENOSIS: ICD-10-CM

## 2017-06-28 DIAGNOSIS — I48.91 ATRIAL FIBRILLATION, UNSPECIFIED TYPE (HCC): ICD-10-CM

## 2017-07-06 ENCOUNTER — TELEPHONE (OUTPATIENT)
Dept: CARDIOLOGY | Facility: MEDICAL CENTER | Age: 82
End: 2017-07-06

## 2017-07-06 DIAGNOSIS — I48.20 CHRONIC ATRIAL FIBRILLATION (HCC): ICD-10-CM

## 2017-07-06 DIAGNOSIS — I65.23 BILATERAL CAROTID ARTERY STENOSIS: ICD-10-CM

## 2017-07-06 NOTE — TELEPHONE ENCOUNTER
Reviewed echo, carotid, and lab results with pt. He is supposed to f/u with [facility] in 1 year but has an appt scheduled 9/08 which was scheduled last year. Will cancel this appt and pt knows to call to make his 1 year f/u appt for 5/2018. He will call in the interim if symptoms or concerns.     TIEN Cobos R.N.                   Please call patient with unremarkable results with good cholesterol profile.     Thanks.  DYLLAN.         ----- Message from Kofi Moseley M.D. sent at 7/5/2017  3:49 PM PDT -----  Please call with abnormal results showing still reduced left ventricular systolic function. We will repeat an echocardiogram in one year.    Thanks.  DYLLAN  ----- Message -----     From: Perla Black R.N.     Sent: 6/28/2017   2:36 PM       To: Kofi Moseley M.D.    To DYLLAN. F/u 9/08

## 2017-07-06 NOTE — TELEPHONE ENCOUNTER
Order placed PT notified.    Daisha MOELLER RN     ----- Message from Kofi Moseley M.D. sent at 7/5/2017  3:47 PM PDT -----  Please call patient with results showing mild to moderate carotid artery stenosis. Please repeat a carotid ultrasound in one year.    Audra MIJARES.  ----- Message -----     From: Daisha Britt R.N.     Sent: 6/23/2017   2:00 PM       To: Kofi Moseley M.D.    F/V with you 9/8th

## 2017-07-24 ENCOUNTER — TELEPHONE (OUTPATIENT)
Dept: CARDIOLOGY | Facility: MEDICAL CENTER | Age: 82
End: 2017-07-24

## 2017-07-24 NOTE — TELEPHONE ENCOUNTER
Pt reports he was in [facility] recently for vomiting and dehydration. He is now at rehab as he has lost a lot of his strength. Pt got a call to schedule lab work and carotid ultrasound and was concerned because he thought he didn't need any of these until next year. Discussed with pt and agreed. His last carotid duplex and CMP was 6/2017. Advised pt okay to get testing done just prior to next years visit unless he is having any concerning symptoms then can always come in sooner. Wished pt a quick recovery from his recent illness. Pt appreciated.

## 2017-08-16 ENCOUNTER — ANTICOAGULATION MONITORING (OUTPATIENT)
Dept: VASCULAR LAB | Facility: MEDICAL CENTER | Age: 82
End: 2017-08-16

## 2017-08-16 DIAGNOSIS — I48.20 CHRONIC ATRIAL FIBRILLATION (HCC): ICD-10-CM

## 2017-08-16 NOTE — PROGRESS NOTES
Patient has been in hospital, then SNF over the last few months.  Miles AGUILAR will be visiting him regularly now, order sent for INR check.  Herbie Rebollar, PHARMD

## 2017-08-22 ENCOUNTER — ANTICOAGULATION MONITORING (OUTPATIENT)
Dept: VASCULAR LAB | Facility: MEDICAL CENTER | Age: 82
End: 2017-08-22

## 2017-08-22 DIAGNOSIS — I48.20 CHRONIC ATRIAL FIBRILLATION (HCC): ICD-10-CM

## 2017-08-22 LAB — INR PPP: 2 (ref 2–3.5)

## 2017-08-22 NOTE — PROGRESS NOTES
OP Anticoagulation Service Note    Date: 8/22/2017    Anticoagulation Summary as of 8/22/2017     INR goal 2.0-3.0   Selected INR No new INR was available at the time of this encounter.   Maintenance plan 4 mg (4 mg x 1) every day   Weekly total 28 mg   Plan last modified Susy Schroeder PHARMD (8/22/2017)   Next INR check 8/29/2017   Target end date Indefinite    Indications   Atrial fibrillation (CMS-HCC) [I48.91]         Anticoagulation Episode Summary     INR check location     Preferred lab     Send INR reminders to     Comments       Anticoagulation Care Providers     Provider Role Specialty Phone number    Kofi Moseley M.D. Referring Cardiology [phone]    [facility] Responsible  [phone]    Herbie Rebollar PHARMD Responsible          Anticoagulation Patient Findings      Plan:  Spoke with patient on the phone. He is re-establishing with our clinic. He was in SNF. He has been taking warfarin  4 mg daily since discharge from SNF. Patient is unsure of other medication and will be ready with list next time we speak with her. 1 Patient denies any signs or symptoms of bleeding or clotting. Instructed patient to call clinic if any unusual bleeding or bruising occurs. Will continue dosing as outlined. Will follow-up with patient in 1 week(s).          Susy Schroeder, Pharm D

## 2017-08-29 ENCOUNTER — ANTICOAGULATION MONITORING (OUTPATIENT)
Dept: VASCULAR LAB | Facility: MEDICAL CENTER | Age: 82
End: 2017-08-29

## 2017-08-29 DIAGNOSIS — I48.20 CHRONIC ATRIAL FIBRILLATION (HCC): ICD-10-CM

## 2017-08-29 LAB — INR PPP: 2.1 (ref 2–3.5)

## 2017-08-29 NOTE — PROGRESS NOTES
OP Anticoagulation Service Note    Date: 8/29/2017    Anticoagulation Summary  As of 8/29/2017    INR goal:   2.0-3.0   TTR:   100.0 % (3.1 mo)   Today's INR:   2.1   Maintenance plan:   4 mg (4 mg x 1) every day   Weekly total:   28 mg   Plan last modified:   Susy Schroeder PharmD (8/22/2017)   Next INR check:   9/5/2017   Target end date:   Indefinite    Indications    Atrial fibrillation (CMS-HCC) [I48.91]             Anticoagulation Episode Summary     INR check location:       Preferred lab:       Send INR reminders to:       Comments:   Please fax Dr. Alejandro Dallas [phone]      Anticoagulation Care Providers     Provider Role Specialty Phone number    Kofi Moseley M.D. Referring Cardiology [phone]    [facility] Anticoagulation Services Responsible  [phone]    Herbie Rebollar PharmD Responsible          Anticoagulation Patient Findings  Patient Findings     Negatives:   Signs/symptoms of thrombosis, Signs/symptoms of bleeding, Laboratory test error suspected, Change in health, Change in alcohol use, Change in activity, Upcoming invasive procedure, Emergency department visit, Upcoming dental procedure, Missed doses, Extra doses, Change in medications, Change in diet/appetite, Hospital admission, Bruising, Other complaints          Plan:  Patient is therapeutic today. Confirmed dosing regimen. Patient denies any changes in medications or diet. Patient denies any signs or symptoms of bleeding or clotting. Instructed patient to call clinic if any unusual bleeding or bruising occurs. Will continue dosing as outlined. Will follow-up with patient in 1 week(s).      Susy Schroeder, Pharm D

## 2017-09-05 ENCOUNTER — TELEPHONE (OUTPATIENT)
Dept: CARDIOLOGY | Facility: MEDICAL CENTER | Age: 82
End: 2017-09-05

## 2017-09-05 NOTE — TELEPHONE ENCOUNTER
S/w Raya at [facility]. Confirmed that faxed was received. Informed that pt has coumadin managed through coumadin clinic. Educated that I will fax over request to clinic. Coumadin clinic to FU with Raya on holding medication.   Information faxed to #[phone]    CHICO Schroeder Pharmacist       ----- Message from Lisha Brunson sent at 9/5/2017 10:59 AM PDT -----  Regarding: coumadin advice  Contact: [phone]  DYLLAN/florinda Dos Santos calling from [facility] for coumadin advice in preparation for 9/19 procedure.  Please call Raya  or fax to .  Raya is faxing you a request now to [phone]

## 2017-09-11 ENCOUNTER — TELEPHONE (OUTPATIENT)
Dept: VASCULAR LAB | Facility: MEDICAL CENTER | Age: 82
End: 2017-09-11

## 2017-09-12 ENCOUNTER — ANTICOAGULATION MONITORING (OUTPATIENT)
Dept: VASCULAR LAB | Facility: MEDICAL CENTER | Age: 82
End: 2017-09-12

## 2017-09-12 DIAGNOSIS — I48.20 CHRONIC ATRIAL FIBRILLATION (HCC): ICD-10-CM

## 2017-09-12 RX ORDER — WARFARIN SODIUM 4 MG/1
TABLET ORAL
Qty: 90 TAB | Refills: 1 | Status: SHIPPED | OUTPATIENT
Start: 2017-09-12 | End: 2018-08-22 | Stop reason: SDUPTHER

## 2017-09-12 NOTE — TELEPHONE ENCOUNTER
Receive request from [facility] to hold warfarin for 7-10 days prior to planned laser lithotripsy and stent placement  CHADS-VASC score = 4  Seems reasonable to hold for 6 days prior to procedure without bridge therapy  Will ask clinical pharmacist to arrange    Michael J. Bloch, MD  Anticoagulation Center    CC: Zana Ling M.D.

## 2017-09-12 NOTE — PROGRESS NOTES
Spoke with pt on the phone regarding upcoming procedure. . Pt has upcoming procedure 9/19/17. He needs to stop warfarin 6 days prior. Pt reports he stopped his warfarin yesterday because of what his paperwork said. Discussed with pt to always communicate procedures with our clinic. Instructed pt to remain off of warfarin until ok with provider doing procedure for pt to restart.     Susy Schroeder, Pharm D

## 2017-09-22 ENCOUNTER — ANTICOAGULATION MONITORING (OUTPATIENT)
Dept: VASCULAR LAB | Facility: MEDICAL CENTER | Age: 82
End: 2017-09-22

## 2017-09-22 LAB — INR PPP: 1.4 (ref 2–3.5)

## 2017-09-22 NOTE — PROGRESS NOTES
Anticoagulation Summary  As of 9/22/2017    INR goal:   2.0-3.0   TTR:   82.6 % (3.9 mo)   Today's INR:   1.4!   Maintenance plan:   4 mg (4 mg x 1) every day   Weekly total:   28 mg   Plan last modified:   Susy Schroeder, PharmD (8/22/2017)   Next INR check:   9/28/2017   Target end date:   Indefinite    Indications    Atrial fibrillation (CMS-HCC) [I48.91]             Anticoagulation Episode Summary     INR check location:       Preferred lab:       Send INR reminders to:       Comments:   Please fax Dr. Alejandro Dallas [phone]      Anticoagulation Care Providers     Provider Role Specialty Phone number    Kofi Moseley M.D. Referring Cardiology [phone]    [facility] Anticoagulation Services Responsible  [phone]    Char AngelD Responsible          Anticoagulation Patient Findings     HPI:  Tej Garrett, on anticoagulation therapy with warfarin for Afib.  Changes to current medical/health status since last appt: Pt had recentlithotripsy and stent placement.  Denies signs/symptoms of bleeding and/or thrombosis since the last appt.    Denies any interval changes to diet  Denies any interval changes to medications since last appt.   Denies any complications or cost restrictions with current therapy.   Confirmed pt held warfarin for procedure.   A/P   INR  SUB-therapeutic.   Bolus 6 mg today then Pt is to continue with current warfarin dosing regimen.     Next INR in 1 weeks per pt availability.    Pawan Chen, PharmD

## 2017-09-28 LAB — INR PPP: 1.5 (ref 2–3.5)

## 2017-10-11 ENCOUNTER — ANTICOAGULATION MONITORING (OUTPATIENT)
Dept: VASCULAR LAB | Facility: MEDICAL CENTER | Age: 82
End: 2017-10-11

## 2017-10-11 DIAGNOSIS — I48.91 ATRIAL FIBRILLATION, UNSPECIFIED TYPE (HCC): ICD-10-CM

## 2017-10-11 NOTE — PROGRESS NOTES
Anticoagulation Summary  As of 10/11/2017    INR goal:   2.0-3.0   TTR:   78.6 % (4.1 mo)   Today's INR:   1.5! (9/28/2017)   Maintenance plan:   6 mg (4 mg x 1.5) on Wed, Fri; 4 mg (4 mg x 1) all other days   Weekly total:   32 mg   Plan last modified:   Herbie Rebollar PharmD (10/11/2017)   Next INR check:   10/17/2017   Target end date:   Indefinite    Indications    Atrial fibrillation (CMS-HCC) [I48.91]             Anticoagulation Episode Summary     INR check location:       Preferred lab:       Send INR reminders to:       Comments:   Please fax Dr. Alejandro Dallas [phone]      Anticoagulation Care Providers     Provider Role Specialty Phone number    Kofi Moseley M.D. Referring Cardiology [phone]    [facility] Anticoagulation Services Responsible  [phone]    Herbie Rebollar, PharmD Responsible          Anticoagulation Patient Findings    Spoke with patient today regarding subtherapeutic INR of 1.5.  Patient denies any signs/symptoms of bruising or bleeding or any changes in diet and medications.  Instructed patient to call clinic with any questions or concerns.  Instructed patient to increase weekly warfarin regimen by ~14% as detailed above.  Follow up in 5 days.    Herbie Rebollar, Jose Eduardo

## 2017-10-16 ENCOUNTER — ANTICOAGULATION MONITORING (OUTPATIENT)
Dept: VASCULAR LAB | Facility: MEDICAL CENTER | Age: 82
End: 2017-10-16

## 2017-10-16 DIAGNOSIS — I48.91 ATRIAL FIBRILLATION, UNSPECIFIED TYPE (HCC): ICD-10-CM

## 2017-10-16 LAB — INR PPP: 1.7 (ref 2–3.5)

## 2017-10-17 NOTE — PROGRESS NOTES
Anticoagulation Summary  As of 10/16/2017    INR goal:   2.0-3.0   TTR:   68.6 % (4.7 mo)   Today's INR:   1.7!   Maintenance plan:   6 mg (4 mg x 1.5) on Mon, Wed, Fri; 4 mg (4 mg x 1) all other days   Weekly total:   34 mg   Plan last modified:   KEERTHI Knox (10/16/2017)   Next INR check:   10/23/2017   Target end date:   Indefinite    Indications    Atrial fibrillation (CMS-HCC) [I48.91]             Anticoagulation Episode Summary     INR check location:       Preferred lab:       Send INR reminders to:       Comments:   Please fax Dr. Alejandro Dallas [phone]      Anticoagulation Care Providers     Provider Role Specialty Phone number    Kofi Moseley M.D. Referring Cardiology [phone]    [facility] Anticoagulation Services Responsible  [phone]    Herbie Rebollar, PharmD Responsible          Anticoagulation Patient Findings        Spoke with patient by phone. Patient is sub-therapeutic. Denies any medication or diet changes. No current symptoms of bleeding or thrombosis reported. Increase dose as below. Follow up in 1 weeks.        Take 4 mg Mon, wed & fri/ 6 mg rest of the week. Test in 1 week.      KEERTHI Knox

## 2017-10-23 ENCOUNTER — ANTICOAGULATION MONITORING (OUTPATIENT)
Dept: VASCULAR LAB | Facility: MEDICAL CENTER | Age: 82
End: 2017-10-23

## 2017-10-23 LAB — INR PPP: 1.8 (ref 2–3.5)

## 2017-10-23 NOTE — PROGRESS NOTES
Anticoagulation Summary  As of 10/23/2017    INR goal:   2.0-3.0   TTR:   65.4 % (5 mo)   Today's INR:   1.8!   Maintenance plan:   4 mg (4 mg x 1) on Thu; 6 mg (4 mg x 1.5) all other days   Weekly total:   40 mg   Plan last modified:   Pawan Chen PharmD (10/23/2017)   Next INR check:   10/30/2017   Target end date:   Indefinite    Indications    Atrial fibrillation (CMS-HCC) [I48.91]             Anticoagulation Episode Summary     INR check location:       Preferred lab:       Send INR reminders to:       Comments:   Please fax Dr. Alejandro Dallas [phone]      Anticoagulation Care Providers     Provider Role Specialty Phone number    Kofi Moseley M.D. Referring Cardiology [phone]    [facility] Responsible  [phone]    Herbie Rebollar, PharmD Responsible          Anticoagulation Patient Findings      HPI:  Tej Garrett, on anticoagulation therapy with warfarin for Afib.   Changes to current medical/health status since last appt: None.   Denies signs/symptoms of bleeding and/or thrombosis since the last appt.    Denies any interval changes to diet  Denies any interval changes to medications since last appt.   Denies any complications or cost restrictions with current therapy.     A/P   INR  SUB-therapeutic.   Begin 11% increased regimen.    Next INR in 1 week(s).    Pawan Chen, PharmD   Faxed

## 2017-11-01 ENCOUNTER — HOSPITAL ENCOUNTER (OUTPATIENT)
Dept: CARDIOLOGY | Facility: MEDICAL CENTER | Age: 82
End: 2017-11-01
Payer: MEDICARE

## 2017-11-01 LAB — INR PPP: 2.4 (ref 2–3.5)

## 2017-11-01 NOTE — PROGRESS NOTES
OP Anticoagulation Telephone Note    Date: 11/1/2017  Physician Name: Lorelei  Duration of Therapy : Indefinite  Reason for Anticoagulation: Atrial Fibrillation  Previous Regimen (mg / week): 40  Signs & Symptoms of Anticoagulation: Bloody, Black or Tarry Stools, Coffee Grounds Emesis  Significant Interactions: NSAID  Assessment: Therapeutic  Target INR: 2.0 to 3.0  [facility]    Plan:      Talked to patient on the phone. Patient is hospitalized in [facility] for upper GI bleed on 10/31/17. Patient has not been feeling well for the past few day with coffee ground emesis and black tarry stools. Patient called his doctor who referred him to be seen at ED. His INR is 2.8 today. H/H was trending down but stable. No transfusion needed. Plan to be discharged tomorrow if all goes well today.     Asked patient to give us a call once he is discharged home to discuss warfarin doses.  (Note: patient was taking warfarin and scheduled naproxen).    Connie Rodriguez, Pharmacy Intern  I have reviewed and concur with the above plan on 11/02/2017.  Leatha Mancini, [facility]

## 2017-11-02 ENCOUNTER — ANTICOAGULATION MONITORING (OUTPATIENT)
Dept: VASCULAR LAB | Facility: MEDICAL CENTER | Age: 82
End: 2017-11-02

## 2017-11-02 DIAGNOSIS — I48.91 ATRIAL FIBRILLATION, UNSPECIFIED TYPE (HCC): ICD-10-CM

## 2017-11-02 NOTE — PROGRESS NOTES
Anticoagulation Summary  As of 11/2/2017    INR goal:   2.0-3.0   TTR:   65.5 % (5.3 mo)   Today's INR:   2.4 (11/1/2017)   Maintenance plan:   4 mg (4 mg x 1) on Thu; 6 mg (4 mg x 1.5) all other days   Weekly total:   40 mg   Plan last modified:   Char GonzalezD (10/23/2017)   Next INR check:   11/8/2017   Target end date:   Indefinite    Indications    Atrial fibrillation (CMS-HCC) [I48.91]             Anticoagulation Episode Summary     INR check location:       Preferred lab:       Send INR reminders to:       Comments:   Please fax Dr. Alejandro Dallas [phone]      Anticoagulation Care Providers     Provider Role Specialty Phone number    Kofi Moseley M.D. Referring Cardiology [phone]    [facility] Anticoagulation Services Responsible  [phone]    Herbie Rebollar, PharmD Responsible          Anticoagulation Patient Findings    See note by Connie Rodriguez 11/01/2017      Char AyalaD

## 2017-11-07 ENCOUNTER — TELEPHONE (OUTPATIENT)
Dept: VASCULAR LAB | Facility: MEDICAL CENTER | Age: 82
End: 2017-11-07

## 2017-11-07 NOTE — TELEPHONE ENCOUNTER
"S/w patient regarding recent hospitalization and GI bleed.  Discharging physician instructed patient to hold warfarin \"for the time being\" and discuss further with PCP this week.  He will meeting with Dr. Dallas on 11-9-17 to discuss.  Will f/u with patient later in week to determine if/when anticoagulation to be restarted.  Herbie Rebollar, PharmD    "

## 2017-11-10 ENCOUNTER — TELEPHONE (OUTPATIENT)
Dept: VASCULAR LAB | Facility: MEDICAL CENTER | Age: 82
End: 2017-11-10

## 2017-11-11 NOTE — TELEPHONE ENCOUNTER
"S/w patient about anticoagulation.  He has not yet spoke with Dr. Dallas about restarting warfarin, he was seen by another provider in same clinic but \"given little direction\".  Has call in to speak with Dr. Dallas directly and will inform [facility] determination made.  Herbie Rebollar, PharmD    "

## 2017-11-16 ENCOUNTER — ANTICOAGULATION MONITORING (OUTPATIENT)
Dept: VASCULAR LAB | Facility: MEDICAL CENTER | Age: 82
End: 2017-11-16

## 2017-11-16 DIAGNOSIS — I48.0 PAROXYSMAL ATRIAL FIBRILLATION (HCC): ICD-10-CM

## 2017-11-16 NOTE — PROGRESS NOTES
Discharged from [facility] Anticoagulation [facility].  Anticoagulation completed 11/16/2017 by Dr. Celena Mancini, PharmD

## 2018-02-27 DIAGNOSIS — Z79.01 CHRONIC ANTICOAGULATION: ICD-10-CM

## 2018-02-27 DIAGNOSIS — I48.91 ATRIAL FIBRILLATION, UNSPECIFIED TYPE (HCC): ICD-10-CM

## 2018-02-27 DIAGNOSIS — I42.9 CARDIOMYOPATHY, UNSPECIFIED TYPE (HCC): ICD-10-CM

## 2018-03-01 RX ORDER — METOPROLOL SUCCINATE 25 MG/1
50 TABLET, EXTENDED RELEASE ORAL DAILY
Qty: 180 TAB | Refills: 1 | Status: SHIPPED | OUTPATIENT
Start: 2018-03-01 | End: 2018-06-04 | Stop reason: SDUPTHER

## 2018-03-01 RX ORDER — LISINOPRIL 10 MG/1
10 TABLET ORAL DAILY
Qty: 90 TAB | Refills: 1 | Status: SHIPPED | OUTPATIENT
Start: 2018-03-01 | End: 2018-06-04 | Stop reason: SDUPTHER

## 2018-04-17 ENCOUNTER — TELEPHONE (OUTPATIENT)
Dept: CARDIOLOGY | Facility: MEDICAL CENTER | Age: 83
End: 2018-04-17

## 2018-04-17 DIAGNOSIS — I42.0 DILATED CARDIOMYOPATHY (HCC): ICD-10-CM

## 2018-04-17 DIAGNOSIS — I65.23 BILATERAL CAROTID ARTERY STENOSIS: ICD-10-CM

## 2018-04-17 DIAGNOSIS — E78.5 DYSLIPIDEMIA: ICD-10-CM

## 2018-04-17 DIAGNOSIS — I10 ESSENTIAL HYPERTENSION, BENIGN: ICD-10-CM

## 2018-04-17 NOTE — TELEPHONE ENCOUNTER
Per Dr. Moseley's OV note from 5/19/17:  2. Dilated cardiomyopathy: His volume is adequate. He was not recommended for AICD per Dr. Matthew. We will repeat an echocardiogram in one year.  3. Ascending aorta dilatio : He remains clinically stable. We will continue with current medical care. Plan as above.  4. Hypertension: Blood pressure is well controlled.  5. Hyperlipidemia: Stable on strict diet and exercise therapy. We will repeat labs including fasting lipid profile in one year.  6. Carotid artery stenosis: Clinically stable on medical therapy. We will repeat a carotid ultrasound in one year.  ------------------------------  S/w pt, he notes that he thinks he might have an appointment for his echo scheduled at [place] for 5/22. Informed him that Dr. Moseley also wanted him to have lab work done and carotid US. He states understanding. Reassured pt that all orders will be faxed to [place] scheduling so he can call them to follow up. Pt appreciative of assistance and will call back if any issues.     Order placed for lipid profile, CMP, carotid duplex and Echo. Information faxed to [facility]. #[phone] (imaging)/ [phone] (lab)    ----- Message from Lisha Brunson sent at 4/17/2018 11:54 AM PDT -----  Regarding: is echo needed in preparation for appt?  Contact: [phone]  MELECIO/florinda    Pt calling to ask if an echo is needed prior to his 6/4 appt with  in [place].  Please call pt at [phone]

## 2018-04-19 ENCOUNTER — TELEPHONE (OUTPATIENT)
Dept: CARDIOLOGY | Facility: MEDICAL CENTER | Age: 83
End: 2018-04-19

## 2018-04-19 NOTE — TELEPHONE ENCOUNTER
FW: PT WOULD LIKE LABS TO BE SENT TO [place] PLEASE   Received: Today        Previous Messages      ----- Message -----   From: Gisselle Pandey   Sent: 4/18/2018  12:10 PM   To: Kofi Moseley M.D.   Subject: PT WOULD LIKE LABS TO BE SENT TO [place] NANCIE MO,   PT HAS SCHED HIS CAROTID DUPLEX AND ECHO AT [place]. HE WOULD LIKE LABS TO ALSO BE SENT TO [place]. PLEASE INFORM PT ONCE THIS IS DONE.     THANK YOU.         Called pt and notified him that [facility] faxed lab work to [place] lab on 4/17. Pt said he talked to [place] and they said they didn't see the lab orders. Lab orders re-faxed to [place] [phone]. Also, mailed pt a copy.

## 2018-05-21 DIAGNOSIS — I10 ESSENTIAL HYPERTENSION, BENIGN: ICD-10-CM

## 2018-05-21 DIAGNOSIS — E78.5 DYSLIPIDEMIA: ICD-10-CM

## 2018-05-21 DIAGNOSIS — I42.0 DILATED CARDIOMYOPATHY (HCC): ICD-10-CM

## 2018-06-04 ENCOUNTER — OFFICE VISIT (OUTPATIENT)
Dept: CARDIOLOGY | Facility: PHYSICIAN GROUP | Age: 83
End: 2018-06-04
Payer: MEDICARE

## 2018-06-04 VITALS
BODY MASS INDEX: 21.26 KG/M2 | OXYGEN SATURATION: 96 % | HEART RATE: 76 BPM | HEIGHT: 75 IN | DIASTOLIC BLOOD PRESSURE: 80 MMHG | SYSTOLIC BLOOD PRESSURE: 124 MMHG | WEIGHT: 171 LBS

## 2018-06-04 DIAGNOSIS — I48.91 ATRIAL FIBRILLATION, UNSPECIFIED TYPE (HCC): ICD-10-CM

## 2018-06-04 DIAGNOSIS — E78.5 DYSLIPIDEMIA: ICD-10-CM

## 2018-06-04 DIAGNOSIS — R06.09 DYSPNEA ON EXERTION: ICD-10-CM

## 2018-06-04 DIAGNOSIS — I42.9 CARDIOMYOPATHY, UNSPECIFIED TYPE (HCC): ICD-10-CM

## 2018-06-04 DIAGNOSIS — Z79.01 CHRONIC ANTICOAGULATION: ICD-10-CM

## 2018-06-04 DIAGNOSIS — I65.23 BILATERAL CAROTID ARTERY STENOSIS: ICD-10-CM

## 2018-06-04 DIAGNOSIS — I10 ESSENTIAL HYPERTENSION, BENIGN: ICD-10-CM

## 2018-06-04 DIAGNOSIS — I48.20 CHRONIC ATRIAL FIBRILLATION (HCC): ICD-10-CM

## 2018-06-04 DIAGNOSIS — I25.10 CORONARY ARTERY DISEASE INVOLVING NATIVE CORONARY ARTERY OF NATIVE HEART WITHOUT ANGINA PECTORIS: ICD-10-CM

## 2018-06-04 DIAGNOSIS — I42.0 DILATED CARDIOMYOPATHY (HCC): ICD-10-CM

## 2018-06-04 DIAGNOSIS — R00.2 PALPITATIONS: ICD-10-CM

## 2018-06-04 PROCEDURE — 99214 OFFICE O/P EST MOD 30 MIN: CPT | Performed by: INTERNAL MEDICINE

## 2018-06-04 RX ORDER — SENNOSIDES 8.6 MG
1 TABLET ORAL
COMMUNITY
End: 2019-10-03

## 2018-06-04 RX ORDER — METOPROLOL SUCCINATE 25 MG/1
50 TABLET, EXTENDED RELEASE ORAL DAILY
Qty: 180 TAB | Refills: 3 | Status: SHIPPED | OUTPATIENT
Start: 2018-06-04 | End: 2018-12-03 | Stop reason: SDUPTHER

## 2018-06-04 RX ORDER — LISINOPRIL 10 MG/1
10 TABLET ORAL DAILY
Qty: 90 TAB | Refills: 3 | Status: SHIPPED | OUTPATIENT
Start: 2018-06-04 | End: 2018-12-03 | Stop reason: SDUPTHER

## 2018-06-04 ASSESSMENT — ENCOUNTER SYMPTOMS
HEADACHES: 0
DIZZINESS: 0
PALPITATIONS: 0

## 2018-06-04 NOTE — PROGRESS NOTES
Subjective:   Chief Complaint:   Chief Complaint   Patient presents with   • Premature Atrial Contractions (PACs)     Previous patient    • Hypertension       Tej Garrett is a 89 y.o. male who presents for follow-up of a nonischemic dilated cardiomyopathy, chronic atrial fibrillation and was previously on chronic anticoagulation.  He has had 2 heart catheterizations and has nonobstructive disease.  His EF came up from 30-45%.  He was evaluated for an ICD when it was low but was told at his age it was likely not necessary.  He has been in chronic atrial fibrillation.      Last November he was hospitalized for bleeding ulcer.  He was on naproxen at the time.  Naproxen has been stopped and he has been on omeprazole.  He has not been rechallenged with warfarin.  He was scoped by a general surgeon here.  He has never seen a gastroenterologist.  Hemoglobin was 8.5 but is now back to 12.7 in February.  He has not had any further issues with bleeding.  His blood pressures at home are controlled.  He brought in a list today.    He is not limited by chest pain or pressure.  He does not have significant/limiting dyspnea on exertion, it is very mild and has not changed.  He does not have dizziness or light headedness.  No orthopnea or significant lower extremity swelling.  He had one episode where he felt lightheaded and weak with some flushing and it lasted for 1 hour and resolved spontaneously.    His previous heart catheterization in 2010 said he had a moderately dilated root but this was not seen on echocardiogram.  He brings in his blood pressures today and they are well controlled.    DATA REVIEWED by me:  ECG 4/27/2017 atrial fibrillation, rate 83, nonspecific intraventricular conduction delay with left anterior fascicular block, PVCs, delayed R-wave progression    Echo 5/21/2018 EF 45%, increased from 30% in 2017, probable diastolic dysfunction in the setting of A. fib, RV systolic function normal, mild MR,  mild AI, moderately dilated left atrium, RVSP 30 mmHg    Carotid ultrasound 6/21/2017 mild and moderate plaque with a less than 50% stenosis    [facility] 4-20-17:  POSTPROCEDURE DIAGNOSES:  1.  No angiographic evidence of coronary artery disease.  2.  Severely reduced left ventricular systolic function with ejection fraction of 20%.  3.  Elevated left ventricular end-diastolic pressure.    Nuclear stress test 3/29/2017, small fixed apical defect.    Most recent labs:    5/21/2018 creatinine 1.23, GFR 55, sodium 141, potassium 4.2, LFTs normal, total cholesterol 120, HDL 62, LDL 47, triglycerides 54    9/2018 hemoglobin 12.7, hematocrit 38.5, .8, platelets 125    Past Medical History:   Diagnosis Date   • Arrhythmia     A-fib.   • Arthritis    • Asthma    • Benign essential hypertension 11/23/2009   • Bowel habit changes     Constipation   • Breath shortness    • Cancer (HCC) ?2014    Bladder and breast.   • Cataract    • Chest tightness or pressure 11/23/2009   • Dental disorder     Lower dentures.   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   • PAC (premature atrial contraction) 11/23/2009   • Pain     Back and hip.   • Palpitations 11/23/2009   • Pneumonia     Around 2010.   • prostate     TURP     Past Surgical History:   Procedure Laterality Date   • LARA BY LAPAROSCOPY  8/8/2010    Performed by IMMANUEL GARCIA at SURGERY [facility] ORS   • UMBILICAL HERNIA REPAIR  8/8/2010    Performed by IMMANUEL GARCIA at [facility] ORS   • OTHER      Bladder tumor removed   • OTHER  ?2012    Left breast cancer (no nipple).     History reviewed. No pertinent family history.  Social History     Social History   • Marital status: Single     Spouse name: N/A   • Number of children: N/A   • Years of education: N/A     Occupational History   • Not on file.     Social History Main Topics   • Smoking status: Former Smoker     Packs/day:  "2.00     Years: 30.00     Quit date: 6/1/1998   • Smokeless tobacco: Never Used      Comment: Quit 10years ago   • Alcohol use Yes      Comment: One glass a wine daily. 1-2/daily and sometimes whiskey and/or beer.   • Drug use: No   • Sexual activity: Not on file     Other Topics Concern   • Not on file     Social History Narrative   • No narrative on file     Allergies   Allergen Reactions   • Antihistamine Decongestant [Dexbrompheniramine-Pseudoeph] Palpitations       Current Outpatient Prescriptions   Medication Sig Dispense Refill   • aspirin 81 MG tablet Take 81 mg by mouth every day.     • Sennosides (SENNA) 8.6 MG Tab Take 1 Tab by mouth every bedtime.     • lisinopril (PRINIVIL) 10 MG Tab Take 1 Tab by mouth every day. 90 Tab 3   • metoprolol SR (TOPROL XL) 25 MG TABLET SR 24 HR Take 2 Tabs by mouth every day. 180 Tab 3   • CALCIUM PO Take  by mouth.     • omeprazole (PRILOSEC) 40 MG capsule Take 40 mg by mouth every day.     • anastrozole (ARIMIDEX) 1 MG TABS Take 1 mg by mouth every day.     • Ferrous Sulfate (IRON) 325 (65 FE) MG TABS Take  by mouth.     • magnesium chloride SR (SLOW-MAG) 535 (64 MG) MG TBCR Take 535 mg by mouth 2 Times a Day.       • albuterol (PROVENTIL) 4 MG TABS Take 4 mg by mouth 2 Times a Day.     • fluoxetine (PROZAC) 20 MG CAPS Take 20 mg by mouth every Monday, Wednesday, and Friday.     • warfarin (COUMADIN) 4 MG Tab Take 1 tablet by mouth daily as directed by the coumadin clinic (Patient not taking: Reported on 6/4/2018) 90 Tab 1     No current facility-administered medications for this visit.        Review of Systems   Cardiovascular: Negative for chest pain and palpitations.   Neurological: Negative for dizziness and headaches.     All others systems reviewed and negative.     Objective:     Blood pressure 124/80, pulse 76, height 1.905 m (6' 3\"), weight 77.6 kg (171 lb), SpO2 96 %. Body mass index is 21.37 kg/m².    Physical Exam   General: No acute distress. Well " nourished.  HEENT: EOM grossly intact, no scleral icterus, no pharyngeal erythema.   Neck:  No JVD, no bruits, trachea midline  CVS: Irreg Irreg. Normal S1, S2. No M/R/G. No LE edema.  2+ radial pulses, 1+ DT pulses  Resp: CTAB. No wheezing or crackles/rhonchi. Normal respiratory effort.  Abdomen: Soft, NT, no camelia hepatomegaly.  MSK/Ext: No clubbing or cyanosis. Mild kyphosis  Skin: Warm and dry, no rashes.  Neurological: CN III-XII grossly intact. No focal deficits.   Psych: A&O x 3, appropriate affect, good judgement      Assessment:     1. Dilated cardiomyopathy (HCC)  Echocardiogram Comp w/o Cont    CBC WITHOUT DIFFERENTIAL   2. Essential hypertension, benign     3. Dyslipidemia     4. Chronic anticoagulation  lisinopril (PRINIVIL) 10 MG Tab    metoprolol SR (TOPROL XL) 25 MG TABLET SR 24 HR   5. Chronic atrial fibrillation (HCC)     6. Bilateral carotid artery stenosis     7. Palpitations     8. Coronary artery disease involving native coronary artery of native heart without angina pectoris     9. Dyspnea on exertion     10. Cardiomyopathy, unspecified type (HCC)  lisinopril (PRINIVIL) 10 MG Tab    metoprolol SR (TOPROL XL) 25 MG TABLET SR 24 HR   11. Atrial fibrillation, unspecified type (HCC)  lisinopril (PRINIVIL) 10 MG Tab    metoprolol SR (TOPROL XL) 25 MG TABLET SR 24 HR       Medical Decision Making:  Today's Assessment / Status / Plan:     1. Atrial fibrillation, for was stopped due to peptic ulcer bleeding in November 2017, currently not on warfarin he is experiencing mild shortness of breath.   2. Dilated cardiomyopathy:  His EF has improved to 45%, does not need ICD and was deemed not to be candidate due to age.  3. Ascending aorta dilatio mention on heart catheterization in 2010 but not seen on echocardiogram so I do not think he actually has this diagnosis.:   4. Hypertension: Blood pressure is well controlled.  5. Hyperlipidemia:  Lipid panel looking good with diet control.  He is not on statin  therapy at this time.  6. Carotid artery plaque with less than 50% stenosis: Clinically stable on medical therapy.  7. Breast cancer treated with anastrozole.    -I have left a message for Dr. Dallas to call me this afternoon.  It is my recommendation that we retrial warfarin.  He has had adequate time off of Naprosyn and on a proton pump inhibitor to heal the ulcer.  His hemoglobin came up from 8.5 12.7.  -If we do restart warfarin I refer him back to the Coumadin clinic and stop aspirin for the short-term.  -Consider cholesterol medicine at next visit      Return in about 3 months (around 9/4/2018).    It is my pleasure to participate in the care of Mr. Garrett.  Please do not hesitate to contact me with questions or concerns.    Nikki Azevedo MD, [facility]  Cardiologist [facility]    Please note that this dictation was created using voice recognition software. I have made every reasonable attempt to correct obvious errors, but it is possible there are errors of grammar and possibly content that I did not discover before finalizing the note.

## 2018-06-04 NOTE — LETTER
[facility] [address]  Phone: [phone]  Fax: [phone]              Tej Garrett  9/6/1928    Encounter Date: 6/4/2018    Nikki Azevedo M.D.          PROGRESS NOTE:  Subjective:   Chief Complaint:   Chief Complaint   Patient presents with   • Premature Atrial Contractions (PACs)     Previous patient    • Hypertension       Tej Garrett is a 89 y.o. male who presents for follow-up of a nonischemic dilated cardiomyopathy, chronic atrial fibrillation and was previously on chronic anticoagulation.  He has had 2 heart catheterizations and has nonobstructive disease.  His EF came up from 30-45%.  He was evaluated for an ICD when it was low but was told at his age it was likely not necessary.  He has been in chronic atrial fibrillation.      Last November he was hospitalized for bleeding ulcer.  He was on naproxen at the time.  Naproxen has been stopped and he has been on omeprazole.  He has not been rechallenged with warfarin.  He was scoped by a general surgeon here.  He has never seen a gastroenterologist.  Hemoglobin was 8.5 but is now back to 12.7 in February.  He has not had any further issues with bleeding.  His blood pressures at home are controlled.  He brought in a list today.    He is not limited by chest pain or pressure.  He does not have significant/limiting dyspnea on exertion, it is very mild and has not changed.  He does not have dizziness or light headedness.  No orthopnea or significant lower extremity swelling.  He had one episode where he felt lightheaded and weak with some flushing and it lasted for 1 hour and resolved spontaneously.    His previous heart catheterization in 2010 said he had a moderately dilated root but this was not seen on echocardiogram.  He brings in his blood pressures today and they are well controlled.    DATA REVIEWED by me:  ECG 4/27/2017 atrial fibrillation,  rate 83, nonspecific intraventricular conduction delay with left anterior fascicular block, PVCs, delayed R-wave progression    Echo 5/21/2018 EF 45%, increased from 30% in 2017, probable diastolic dysfunction in the setting of A. fib, RV systolic function normal, mild MR, mild AI, moderately dilated left atrium, RVSP 30 mmHg    Carotid ultrasound 6/21/2017 mild and moderate plaque with a less than 50% stenosis    [facility] 4-20-17:  POSTPROCEDURE DIAGNOSES:  1.  No angiographic evidence of coronary artery disease.  2.  Severely reduced left ventricular systolic function with ejection fraction of 20%.  3.  Elevated left ventricular end-diastolic pressure.    Nuclear stress test 3/29/2017, small fixed apical defect.    Most recent labs:    5/21/2018 creatinine 1.23, GFR 55, sodium 141, potassium 4.2, LFTs normal, total cholesterol 120, HDL 62, LDL 47, triglycerides 54    9/2018 hemoglobin 12.7, hematocrit 38.5, .8, platelets 125    Past Medical History:   Diagnosis Date   • Arrhythmia     A-fib.   • Arthritis    • Asthma    • Benign essential hypertension 11/23/2009   • Bowel habit changes     Constipation   • Breath shortness    • Cancer (HCC) ?2014    Bladder and breast.   • Cataract    • Chest tightness or pressure 11/23/2009   • Dental disorder     Lower dentures.   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   • PAC (premature atrial contraction) 11/23/2009   • Pain     Back and hip.   • Palpitations 11/23/2009   • Pneumonia     Around 2010.   • prostate     TURP     Past Surgical History:   Procedure Laterality Date   • LARA BY LAPAROSCOPY  8/8/2010    Performed by IMMANUEL GARCIA at SURGERY [facility] ORS   • UMBILICAL HERNIA REPAIR  8/8/2010    Performed by IMMANUEL GARCIA at SURGERY [facility] ORS   • OTHER      Bladder tumor removed   • OTHER  ?2012    Left breast cancer (no nipple).     History reviewed. No pertinent  family history.  Social History     Social History   • Marital status: Single     Spouse name: N/A   • Number of children: N/A   • Years of education: N/A     Occupational History   • Not on file.     Social History Main Topics   • Smoking status: Former Smoker     Packs/day: 2.00     Years: 30.00     Quit date: 6/1/1998   • Smokeless tobacco: Never Used      Comment: Quit 10years ago   • Alcohol use Yes      Comment: One glass a wine daily. 1-2/daily and sometimes whiskey and/or beer.   • Drug use: No   • Sexual activity: Not on file     Other Topics Concern   • Not on file     Social History Narrative   • No narrative on file     Allergies   Allergen Reactions   • Antihistamine Decongestant [Dexbrompheniramine-Pseudoeph] Palpitations       Current Outpatient Prescriptions   Medication Sig Dispense Refill   • aspirin 81 MG tablet Take 81 mg by mouth every day.     • Sennosides (SENNA) 8.6 MG Tab Take 1 Tab by mouth every bedtime.     • lisinopril (PRINIVIL) 10 MG Tab Take 1 Tab by mouth every day. 90 Tab 3   • metoprolol SR (TOPROL XL) 25 MG TABLET SR 24 HR Take 2 Tabs by mouth every day. 180 Tab 3   • CALCIUM PO Take  by mouth.     • omeprazole (PRILOSEC) 40 MG capsule Take 40 mg by mouth every day.     • anastrozole (ARIMIDEX) 1 MG TABS Take 1 mg by mouth every day.     • Ferrous Sulfate (IRON) 325 (65 FE) MG TABS Take  by mouth.     • magnesium chloride SR (SLOW-MAG) 535 (64 MG) MG TBCR Take 535 mg by mouth 2 Times a Day.       • albuterol (PROVENTIL) 4 MG TABS Take 4 mg by mouth 2 Times a Day.     • fluoxetine (PROZAC) 20 MG CAPS Take 20 mg by mouth every Monday, Wednesday, and Friday.     • warfarin (COUMADIN) 4 MG Tab Take 1 tablet by mouth daily as directed by the coumadin clinic (Patient not taking: Reported on 6/4/2018) 90 Tab 1     No current facility-administered medications for this visit.        Review of Systems   Cardiovascular: Negative for chest pain and palpitations.   Neurological: Negative for  "dizziness and headaches.     All others systems reviewed and negative.     Objective:     Blood pressure 124/80, pulse 76, height 1.905 m (6' 3\"), weight 77.6 kg (171 lb), SpO2 96 %. Body mass index is 21.37 kg/m².    Physical Exam   General: No acute distress. Well nourished.  HEENT: EOM grossly intact, no scleral icterus, no pharyngeal erythema.   Neck:  No JVD, no bruits, trachea midline  CVS: Irreg Irreg. Normal S1, S2. No M/R/G. No LE edema.  2+ radial pulses, 1+ DT pulses  Resp: CTAB. No wheezing or crackles/rhonchi. Normal respiratory effort.  Abdomen: Soft, NT, no camelia hepatomegaly.  MSK/Ext: No clubbing or cyanosis. Mild kyphosis  Skin: Warm and dry, no rashes.  Neurological: CN III-XII grossly intact. No focal deficits.   Psych: A&O x 3, appropriate affect, good judgement      Assessment:     1. Dilated cardiomyopathy (HCC)  Echocardiogram Comp w/o Cont    CBC WITHOUT DIFFERENTIAL   2. Essential hypertension, benign     3. Dyslipidemia     4. Chronic anticoagulation  lisinopril (PRINIVIL) 10 MG Tab    metoprolol SR (TOPROL XL) 25 MG TABLET SR 24 HR   5. Chronic atrial fibrillation (HCC)     6. Bilateral carotid artery stenosis     7. Palpitations     8. Coronary artery disease involving native coronary artery of native heart without angina pectoris     9. Dyspnea on exertion     10. Cardiomyopathy, unspecified type (HCC)  lisinopril (PRINIVIL) 10 MG Tab    metoprolol SR (TOPROL XL) 25 MG TABLET SR 24 HR   11. Atrial fibrillation, unspecified type (HCC)  lisinopril (PRINIVIL) 10 MG Tab    metoprolol SR (TOPROL XL) 25 MG TABLET SR 24 HR       Medical Decision Making:  Today's Assessment / Status / Plan:     1. Atrial fibrillation, for was stopped due to peptic ulcer bleeding in November 2017, currently not on warfarin he is experiencing mild shortness of breath.   2. Dilated cardiomyopathy:  His EF has improved to 45%, does not need ICD and was deemed not to be candidate due to age.  3. Ascending aorta " luis angel mention on heart catheterization in 2010 but not seen on echocardiogram so I do not think he actually has this diagnosis.:   4. Hypertension: Blood pressure is well controlled.  5. Hyperlipidemia:  Lipid panel looking good with diet control.  He is not on statin therapy at this time.  6. Carotid artery plaque with less than 50% stenosis: Clinically stable on medical therapy.  7. Breast cancer treated with anastrozole.    -I have left a message for Dr. Dallas to call me this afternoon.  It is my recommendation that we retrial warfarin.  He has had adequate time off of Naprosyn and on a proton pump inhibitor to heal the ulcer.  His hemoglobin came up from 8.5 12.7.  -If we do restart warfarin I refer him back to the Coumadin clinic and stop aspirin for the short-term.  -Consider cholesterol medicine at next visit      Return in about 3 months (around 9/4/2018).    It is my pleasure to participate in the care of Mr. Garrett.  Please do not hesitate to contact me with questions or concerns.    Nikki Azevedo MD, [facility]  Cardiologist [facility]    Please note that this dictation was created using voice recognition software. I have made every reasonable attempt to correct obvious errors, but it is possible there are errors of grammar and possibly content that I did not discover before finalizing the note.      Alejandro Dallas M.D.  [address] [facility] [place] [address]  VIA Facsimile: [phone]

## 2018-06-15 ENCOUNTER — TELEPHONE (OUTPATIENT)
Dept: CARDIOLOGY | Facility: MEDICAL CENTER | Age: 83
End: 2018-06-15

## 2018-06-15 ENCOUNTER — ANTICOAGULATION MONITORING (OUTPATIENT)
Dept: VASCULAR LAB | Facility: MEDICAL CENTER | Age: 83
End: 2018-06-15

## 2018-06-15 DIAGNOSIS — Z79.01 CHRONIC ANTICOAGULATION: ICD-10-CM

## 2018-06-15 LAB — INR PPP: 1.6 (ref 2–3.5)

## 2018-06-15 NOTE — TELEPHONE ENCOUNTER
Call received from Jewels from [facility] coumdain [facility]. Notes that they just wanted to follow up to see if pt is needing to be scheduled for an appointment or not. Educated that I will need to FU with Dr. Azevedo on her recommendations if pt is suppose to restart Coumadin. Notes I can give her a call back @[phone].     To Dr. Azevedo, please advise. Are you wanting pt to FU with coumadin clinic to be restarted? thx

## 2018-06-15 NOTE — PROGRESS NOTES
Anticoagulation Summary  As of 6/15/2018    INR goal:   2.0-3.0   TTR:   67.5 % (5.6 mo)   Today's INR:   1.6!   Warfarin maintenance plan:   6.5 mg (4 mg x 1 and 2.5 mg x 1), then 4 mg (4 mg x 1) repeating every 2 days   Average weekly warfarin total:   36.75 mg   Plan last modified:   Ingrid Mariscal, PharmD (6/15/2018)   Next INR check:   6/22/2018   Target end date:   Indefinite    Indications    Atrial fibrillation (HCC) [I48.91]  Chronic anticoagulation [Z79.01]             Anticoagulation Episode Summary     INR check location:       Preferred lab:       Send INR reminders to:       Comments:           Anticoagulation Patient Findings      Spoke with patient.  Evidently he resumed warfarin at 2.5mg after seeing Dr. Azevedo on 6/4/18. He took that for 2 days and then found his 4mg warfarin tablets that he had before, and then switched to 4mg daily.     Advised for him to start to take 4mg QOD with 6.5mg of warfarin and then to recheck INR in 1 week. Pt's phone then went out of service.    Ingrid Mariscal, PharmD

## 2018-06-18 NOTE — TELEPHONE ENCOUNTER
Attempted to call coumadin clinic to follow up. L/m to please call back.    1610: S/w coumadin clinic. At this time no need to place new referral. Ingrid Mariscal s/w pt on 6/15 and has restarted coumadin.    Nikki Azevedo M.D.  My Nielsen R.N.   Caller: Unspecified (3 days ago,  2:11 PM)      Yes, coumadin clinic please. tx! LS

## 2018-07-03 ENCOUNTER — ANTICOAGULATION MONITORING (OUTPATIENT)
Dept: VASCULAR LAB | Facility: MEDICAL CENTER | Age: 83
End: 2018-07-03

## 2018-07-03 DIAGNOSIS — Z79.01 CHRONIC ANTICOAGULATION: ICD-10-CM

## 2018-07-03 LAB — INR PPP: 1.8 (ref 2–3.5)

## 2018-07-03 NOTE — PROGRESS NOTES
Anticoagulation Summary  As of 7/3/2018    INR goal:   2.0-3.0   TTR:   0.0 % (1.1 wk)   Today's INR:   1.8!   Warfarin maintenance plan:   6.5 mg (4 mg x 1 and 2.5 mg x 1), then 4 mg (4 mg x 1) repeating every 2 days   Average weekly warfarin total:   36.75 mg   Plan last modified:   Ingrid Mariscal, PharmD (6/15/2018)   Next INR check:   7/24/2018   Target end date:   Indefinite    Indications    Atrial fibrillation (HCC) [I48.91]  Chronic anticoagulation [Z79.01]             Anticoagulation Episode Summary     INR check location:       Preferred lab:       Send INR reminders to:       Comments:           Anticoagulation Patient Findings      Spoke to patient on the phone.   INR  sub-therapeutic.   Denies signs/symptoms of bleeding and/or thrombosis.   Denies changes to diet or medications.   Follow up appointment in 3 week(s).    6.5mg today then continue weekly warfarin dose as noted      Ramon Queen, PharmD

## 2018-08-08 ENCOUNTER — TELEPHONE (OUTPATIENT)
Dept: VASCULAR LAB | Facility: MEDICAL CENTER | Age: 83
End: 2018-08-08

## 2018-08-08 NOTE — TELEPHONE ENCOUNTER
Spoke with pt regarding INR reminder.   He states that he went a couple weeks ago to [facility] for INR draw.     Ingrid Mariscal, PharmD

## 2018-08-21 ENCOUNTER — TELEPHONE (OUTPATIENT)
Dept: VASCULAR LAB | Facility: MEDICAL CENTER | Age: 83
End: 2018-08-21

## 2018-08-21 NOTE — TELEPHONE ENCOUNTER
OP Anticoagulation Telephone Note    Date: 8/21/2018       Plan:  Called to remind patient to get PT/INR lab done. Pt reports will go to lab tomorrow     Susy Schroeder, Pharm D

## 2018-08-22 DIAGNOSIS — Z79.01 CHRONIC ANTICOAGULATION: ICD-10-CM

## 2018-08-22 RX ORDER — WARFARIN SODIUM 4 MG/1
TABLET ORAL
Qty: 135 TAB | Refills: 1 | Status: SHIPPED | OUTPATIENT
Start: 2018-08-22 | End: 2018-10-08

## 2018-08-23 ENCOUNTER — ANTICOAGULATION MONITORING (OUTPATIENT)
Dept: VASCULAR LAB | Facility: MEDICAL CENTER | Age: 83
End: 2018-08-23

## 2018-08-23 DIAGNOSIS — Z79.01 CHRONIC ANTICOAGULATION: ICD-10-CM

## 2018-08-23 LAB — INR PPP: 2.1 (ref 2–3.5)

## 2018-08-23 NOTE — PROGRESS NOTES
Anticoagulation Summary  As of 8/23/2018    INR goal:   2.0-3.0   TTR:   28.8 % (2 mo)   Today's INR:   2.1   Warfarin maintenance plan:   6.5 mg (4 mg x 1 and 2.5 mg x 1), then 4 mg (4 mg x 1) repeating every 2 days   Average weekly warfarin total:   36.75 mg   No change documented:   Renata Mosher, Pharmacy Intern   Plan last modified:   Ingrid Mariscal, Jose Eduardo (6/15/2018)   Next INR check:   9/20/2018   Target end date:   Indefinite    Indications    Atrial fibrillation (HCC) [I48.91]  Chronic anticoagulation [Z79.01]             Anticoagulation Episode Summary     INR check location:       Preferred lab:       Send INR reminders to:       Comments:         Anticoagulation Care Providers     Provider Role Specialty Phone number    [facility] Responsible  [phone]    Jerad Collins Responsible          Anticoagulation Patient Findings    Left voicemail message for patient regarding therapeutic INR of 2.1.  Advised patient to continue current dosing regimen as listed above.  Instructed patient to call the clinic if he experiences any signs/symptoms of bleeding or if any changes are made to his medications or diet.  Next INR scheduled 9/20/2018.    Renata Mosher, Pharmacy Intern    08/24/18  Cosignature - Ingrid Mariscal, PharmD

## 2018-09-12 ENCOUNTER — OFFICE VISIT (OUTPATIENT)
Dept: CARDIOLOGY | Facility: PHYSICIAN GROUP | Age: 83
End: 2018-09-12
Payer: MEDICARE

## 2018-09-12 VITALS
SYSTOLIC BLOOD PRESSURE: 156 MMHG | HEART RATE: 70 BPM | OXYGEN SATURATION: 100 % | WEIGHT: 172 LBS | HEIGHT: 74 IN | BODY MASS INDEX: 22.07 KG/M2 | DIASTOLIC BLOOD PRESSURE: 80 MMHG

## 2018-09-12 DIAGNOSIS — I65.23 BILATERAL CAROTID ARTERY STENOSIS: ICD-10-CM

## 2018-09-12 DIAGNOSIS — I25.5 ISCHEMIC CARDIOMYOPATHY: ICD-10-CM

## 2018-09-12 DIAGNOSIS — R06.09 DYSPNEA ON EXERTION: ICD-10-CM

## 2018-09-12 DIAGNOSIS — R42 INTERMITTENT LIGHTHEADEDNESS: ICD-10-CM

## 2018-09-12 DIAGNOSIS — I25.10 CORONARY ARTERY DISEASE INVOLVING NATIVE CORONARY ARTERY OF NATIVE HEART WITHOUT ANGINA PECTORIS: ICD-10-CM

## 2018-09-12 DIAGNOSIS — I10 ESSENTIAL HYPERTENSION, BENIGN: ICD-10-CM

## 2018-09-12 DIAGNOSIS — I42.0 DILATED CARDIOMYOPATHY (HCC): ICD-10-CM

## 2018-09-12 DIAGNOSIS — R00.2 PALPITATIONS: ICD-10-CM

## 2018-09-12 DIAGNOSIS — Z79.01 CHRONIC ANTICOAGULATION: ICD-10-CM

## 2018-09-12 DIAGNOSIS — R07.89 OTHER CHEST PAIN: ICD-10-CM

## 2018-09-12 DIAGNOSIS — I49.1 PAC (PREMATURE ATRIAL CONTRACTION): Chronic | ICD-10-CM

## 2018-09-12 DIAGNOSIS — I48.20 CHRONIC ATRIAL FIBRILLATION (HCC): ICD-10-CM

## 2018-09-12 DIAGNOSIS — E78.5 DYSLIPIDEMIA: ICD-10-CM

## 2018-09-12 PROCEDURE — 93000 ELECTROCARDIOGRAM COMPLETE: CPT | Performed by: INTERNAL MEDICINE

## 2018-09-12 PROCEDURE — 99214 OFFICE O/P EST MOD 30 MIN: CPT | Performed by: INTERNAL MEDICINE

## 2018-09-12 NOTE — PROGRESS NOTES
Subjective:   Chief Complaint:   Chief Complaint   Patient presents with   • Atrial Fibrillation       Tej Garrett is a 89 y.o. male who presents for follow-up of a nonischemic dilated cardiomyopathy, chronic atrial fibrillation and was previously on chronic anticoagulation.  He has had 2 heart catheterizations and has nonobstructive disease.  His EF came up to 30-45% so ICD was not needed.  He has been in chronic atrial fibrillation.       November 2017 he was hospitalized for bleeding ulcer.  He was on naproxen at the time.  Naproxen has been stopped and he has been on omeprazole.  He has  been rechallenged with warfarin after I spoke with his primary care physician Dr Dallas.   He has not had any further issues with bleeding.  His blood pressures at home are controlled.  His INRs have been in range.     He is not limited by chest pain or pressure.  He has chronic chest pain that comes and goes and lasts for a few minutes but has not been cardiac in the past and has not significantly changed.  He does not have significant/limiting dyspnea on exertion, it is very mild and has not changed.  He does not have dizziness or light headedness.  No orthopnea or significant lower extremity swelling.  No significant dizziness.  He does get chronic upper extremity numbness and tingling which is typically worse on the right and worse after laying down which I think is mechanical.     His previous heart catheterization in 2010 said he had a moderately dilated root but this was not seen on echocardiogram.  He has carotid artery plaque but no stenosis, this was in June 2017.     DATA REVIEWED by me:  ECG 9-12-18 afib, rate 89, NSIVCD, LAFB vs old inferior mi    ECG 4/27/2017 atrial fibrillation, rate 83, nonspecific intraventricular conduction delay with left anterior fascicular block, PVCs, delayed R-wave progression     Echo 5/21/2018 EF 45%, increased from 30% in 2017, probable diastolic dysfunction in the setting  of A. fib, RV systolic function normal, mild MR, mild AI, moderately dilated left atrium, RVSP 30 mmHg     Carotid ultrasound 6/21/2017 mild and moderate plaque with a less than 50% stenosis     [facility] 4-20-17:  POSTPROCEDURE DIAGNOSES:  1.  No angiographic evidence of coronary artery disease.  2.  Severely reduced left ventricular systolic function with ejection fraction of 20%.  3.  Elevated left ventricular end-diastolic pressure.     Nuclear stress test 3/29/2017, small fixed apical defect.     Most recent labs:    5/21/2018 creatinine 1.23, GFR 55, sodium 141, potassium 4.2, LFTs normal, total cholesterol 120, HDL 62, LDL 47, triglycerides 54     9/2018 hemoglobin 12.7, hematocrit 38.5, .8, platelets 125  INR 8-23-18 2.1  August 1, 2018 hemoglobin 13.1, hematocrit 39.3, platelets 137, creatinine 1.28, GFR 53, sodium 141, potassium 3.9, LFTs normal        Past Medical History:   Diagnosis Date   • Arrhythmia     A-fib.   • Arthritis    • Asthma    • Benign essential hypertension 11/23/2009   • Bowel habit changes     Constipation   • Breath shortness    • Cancer (HCC) ?2014    Bladder and breast.   • Cataract    • Chest tightness or pressure 11/23/2009   • Dental disorder     Lower dentures.   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   • PAC (premature atrial contraction) 11/23/2009   • Pain     Back and hip.   • Palpitations 11/23/2009   • Pneumonia     Around 2010.   • prostate     TURP     Past Surgical History:   Procedure Laterality Date   • LARA BY LAPAROSCOPY  8/8/2010    Performed by IMMANUEL GARCIA at SURGERY [facility] ORS   • UMBILICAL HERNIA REPAIR  8/8/2010    Performed by IMMANUEL GARCIA at SURGERY [facility] ORS   • OTHER      Bladder tumor removed   • OTHER  ?2012    Left breast cancer (no nipple).     History reviewed. No pertinent family history.  Social History     Social History   • Marital status:  "Single     Spouse name: N/A   • Number of children: N/A   • Years of education: N/A     Occupational History   • Not on file.     Social History Main Topics   • Smoking status: Former Smoker     Packs/day: 2.00     Years: 30.00     Quit date: 6/1/1998   • Smokeless tobacco: Never Used      Comment: Quit 10years ago   • Alcohol use Yes      Comment: One glass a wine daily. 1-2/daily and sometimes whiskey and/or beer.   • Drug use: No   • Sexual activity: Not on file     Other Topics Concern   • Not on file     Social History Narrative   • No narrative on file     Allergies   Allergen Reactions   • Antihistamine Decongestant [Dexbrompheniramine-Pseudoeph] Palpitations       Current Outpatient Prescriptions   Medication Sig Dispense Refill   • warfarin (COUMADIN) 4 MG Tab Take 1 to 1.5 tablets by mouth daily as directed by Coumadin Clinic. 135 Tab 1   • aspirin 81 MG tablet Take 81 mg by mouth every day.     • Sennosides (SENNA) 8.6 MG Tab Take 1 Tab by mouth every bedtime.     • lisinopril (PRINIVIL) 10 MG Tab Take 1 Tab by mouth every day. 90 Tab 3   • metoprolol SR (TOPROL XL) 25 MG TABLET SR 24 HR Take 2 Tabs by mouth every day. 180 Tab 3   • CALCIUM PO Take  by mouth.     • omeprazole (PRILOSEC) 40 MG capsule Take 40 mg by mouth every day.     • anastrozole (ARIMIDEX) 1 MG TABS Take 1 mg by mouth every day.     • Ferrous Sulfate (IRON) 325 (65 FE) MG TABS Take  by mouth.     • magnesium chloride SR (SLOW-MAG) 535 (64 MG) MG TBCR Take 535 mg by mouth 2 Times a Day.       • fluoxetine (PROZAC) 20 MG CAPS Take 20 mg by mouth every Monday, Wednesday, and Friday.       No current facility-administered medications for this visit.        ROS  All others systems reviewed and negative.     Objective:     Blood pressure 156/80, pulse 70, height 1.88 m (6' 2\"), weight 78 kg (172 lb), SpO2 100 %. Body mass index is 22.08 kg/m².    Physical Exam   General: No acute distress. Well nourished.   appears younger than stated " age.  HEENT: EOM grossly intact, no scleral icterus, no pharyngeal erythema.   Neck:  No JVD, no bruits, trachea midline  CVS: Irreg irreg. Normal S1, S2. No M/R/G. No LE edema.  2+ radial pulses, 2+ DP pulses  Resp: CTAB. No wheezing or crackles/rhonchi. Normal respiratory effort.  Abdomen: Soft, NT, no camelia hepatomegaly.  MSK/Ext: No clubbing or cyanosis.  Skin: Warm and dry, no rashes.  Neurological: CN III-XII grossly intact. No focal deficits.   Psych: A&O x 3, appropriate affect, good judgement      Assessment:     1. Dilated cardiomyopathy (HCC)     2. Essential hypertension, benign     3. Dyslipidemia     4. Chronic anticoagulation     5. Chronic atrial fibrillation (HCC)     6. Bilateral carotid artery stenosis     7. Palpitations     8. Coronary artery disease involving native coronary artery of native heart without angina pectoris     9. Dyspnea on exertion     10. Ischemic cardiomyopathy     11. Intermittent lightheadedness     12. Other chest pain     13. PAC (premature atrial contraction)         Medical Decision Making:  Today's Assessment / Status / Plan:       1. Atrial fibrillation, for was stopped due to peptic ulcer bleeding in November 2017, currently not on warfarin he is experiencing mild shortness of breath.   2. Dilated cardiomyopathy:  His EF has improved to 45%, does not need ICD and was deemed not to be candidate due to age.  3. Ascending a Lindsay dilatio mention on heart catheterization in 2010 but not seen on echocardiogram so I do not think he actually has this diagnosis.:   4. Hypertension: Blood pressure is high today but usually well controlled.  5. Hyperlipidemia:  Lipid panel looking good with diet control.  He is not on statin therapy at this time and I would not start it at this poing.  6. Carotid artery plaque with less than 50% stenosis: Clinically stable, I don't think I would routinely follow this unless he has a CVA.  7. Breast cancer treated with anastrozole.  8. Atypical  chest pain, not changed.  9. Chronic anticoagulation, prior GI bleed on NSAIDs, doing well so far, no bleeding  10. Constipation, better     -No medication changes today.  I think a conservative approach is best with the exception of the warfarin.  However if he has recurrence of serious bleeding and I likely would not retrial the warfarin going forward.  -BP was high today but usually controlled so no changes. Could consider amlodipine as next agent if needed.    Return in about 6 months (around 3/12/2019).    It is my pleasure to participate in the care of Mr. Garrett.  Please do not hesitate to contact me with questions or concerns.    Nikki Azevedo MD, [facility]  Cardiologist [facility]    Please note that this dictation was created using voice recognition software. I have made every reasonable attempt to correct obvious errors, but it is possible there are errors of grammar and possibly content that I did not discover before finalizing the note.

## 2018-09-12 NOTE — LETTER
[facility] [address]  Phone: [phone]  Fax: [phone]              Tej Garrett  9/6/1928    Encounter Date: 9/12/2018    Nikki Azevedo M.D.          PROGRESS NOTE:  Subjective:   Chief Complaint:   Chief Complaint   Patient presents with   • Atrial Fibrillation       Tej Garrett is a 89 y.o. male who presents for follow-up of a nonischemic dilated cardiomyopathy, chronic atrial fibrillation and was previously on chronic anticoagulation.  He has had 2 heart catheterizations and has nonobstructive disease.  His EF came up to 30-45% so ICD was not needed.  He has been in chronic atrial fibrillation.       November 2017 he was hospitalized for bleeding ulcer.  He was on naproxen at the time.  Naproxen has been stopped and he has been on omeprazole.  He has  been rechallenged with warfarin after I spoke with his primary care physician Dr Dallas.   He has not had any further issues with bleeding.  His blood pressures at home are controlled.  His INRs have been in range.     He is not limited by chest pain or pressure.  He has chronic chest pain that comes and goes and lasts for a few minutes but has not been cardiac in the past and has not significantly changed.  He does not have significant/limiting dyspnea on exertion, it is very mild and has not changed.  He does not have dizziness or light headedness.  No orthopnea or significant lower extremity swelling.  No significant dizziness.  He does get chronic upper extremity numbness and tingling which is typically worse on the right and worse after laying down which I think is mechanical.     His previous heart catheterization in 2010 said he had a moderately dilated root but this was not seen on echocardiogram.  He has carotid artery plaque but no stenosis, this was in June 2017.     DATA REVIEWED by me:  ECG 9-12-18 afib, rate 89, NSIVCD, LAFB vs old  inferior mi    ECG 4/27/2017 atrial fibrillation, rate 83, nonspecific intraventricular conduction delay with left anterior fascicular block, PVCs, delayed R-wave progression     Echo 5/21/2018 EF 45%, increased from 30% in 2017, probable diastolic dysfunction in the setting of A. fib, RV systolic function normal, mild MR, mild AI, moderately dilated left atrium, RVSP 30 mmHg     Carotid ultrasound 6/21/2017 mild and moderate plaque with a less than 50% stenosis     [facility] 4-20-17:  POSTPROCEDURE DIAGNOSES:  1.  No angiographic evidence of coronary artery disease.  2.  Severely reduced left ventricular systolic function with ejection fraction of 20%.  3.  Elevated left ventricular end-diastolic pressure.     Nuclear stress test 3/29/2017, small fixed apical defect.     Most recent labs:    5/21/2018 creatinine 1.23, GFR 55, sodium 141, potassium 4.2, LFTs normal, total cholesterol 120, HDL 62, LDL 47, triglycerides 54     9/2018 hemoglobin 12.7, hematocrit 38.5, .8, platelets 125  INR 8-23-18 2.1  August 1, 2018 hemoglobin 13.1, hematocrit 39.3, platelets 137, creatinine 1.28, GFR 53, sodium 141, potassium 3.9, LFTs normal        Past Medical History:   Diagnosis Date   • Arrhythmia     A-fib.   • Arthritis    • Asthma    • Benign essential hypertension 11/23/2009   • Bowel habit changes     Constipation   • Breath shortness    • Cancer (HCC) ?2014    Bladder and breast.   • Cataract    • Chest tightness or pressure 11/23/2009   • Dental disorder     Lower dentures.   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   • PAC (premature atrial contraction) 11/23/2009   • Pain     Back and hip.   • Palpitations 11/23/2009   • Pneumonia     Around 2010.   • prostate     TURP     Past Surgical History:   Procedure Laterality Date   • LARA BY LAPAROSCOPY  8/8/2010    Performed by IMMANUEL GARCIA at [facility] ORS   • UMBILICAL  HERNIA REPAIR  8/8/2010    Performed by IMMANUEL GARCIA at [facility]   • OTHER      Bladder tumor removed   • OTHER  ?2012    Left breast cancer (no nipple).     History reviewed. No pertinent family history.  Social History     Social History   • Marital status: Single     Spouse name: N/A   • Number of children: N/A   • Years of education: N/A     Occupational History   • Not on file.     Social History Main Topics   • Smoking status: Former Smoker     Packs/day: 2.00     Years: 30.00     Quit date: 6/1/1998   • Smokeless tobacco: Never Used      Comment: Quit 10years ago   • Alcohol use Yes      Comment: One glass a wine daily. 1-2/daily and sometimes whiskey and/or beer.   • Drug use: No   • Sexual activity: Not on file     Other Topics Concern   • Not on file     Social History Narrative   • No narrative on file     Allergies   Allergen Reactions   • Antihistamine Decongestant [Dexbrompheniramine-Pseudoeph] Palpitations       Current Outpatient Prescriptions   Medication Sig Dispense Refill   • warfarin (COUMADIN) 4 MG Tab Take 1 to 1.5 tablets by mouth daily as directed by [facility]. 135 Tab 1   • aspirin 81 MG tablet Take 81 mg by mouth every day.     • Sennosides (SENNA) 8.6 MG Tab Take 1 Tab by mouth every bedtime.     • lisinopril (PRINIVIL) 10 MG Tab Take 1 Tab by mouth every day. 90 Tab 3   • metoprolol SR (TOPROL XL) 25 MG TABLET SR 24 HR Take 2 Tabs by mouth every day. 180 Tab 3   • CALCIUM PO Take  by mouth.     • omeprazole (PRILOSEC) 40 MG capsule Take 40 mg by mouth every day.     • anastrozole (ARIMIDEX) 1 MG TABS Take 1 mg by mouth every day.     • Ferrous Sulfate (IRON) 325 (65 FE) MG TABS Take  by mouth.     • magnesium chloride SR (SLOW-MAG) 535 (64 MG) MG TBCR Take 535 mg by mouth 2 Times a Day.       • fluoxetine (PROZAC) 20 MG CAPS Take 20 mg by mouth every Monday, Wednesday, and Friday.       No current facility-administered medications for this visit.        ROS  All  "others systems reviewed and negative.     Objective:     Blood pressure 156/80, pulse 70, height 1.88 m (6' 2\"), weight 78 kg (172 lb), SpO2 100 %. Body mass index is 22.08 kg/m².    Physical Exam   General: No acute distress. Well nourished.   appears younger than stated age.  HEENT: EOM grossly intact, no scleral icterus, no pharyngeal erythema.   Neck:  No JVD, no bruits, trachea midline  CVS: Irreg irreg. Normal S1, S2. No M/R/G. No LE edema.  2+ radial pulses, 2+ DP pulses  Resp: CTAB. No wheezing or crackles/rhonchi. Normal respiratory effort.  Abdomen: Soft, NT, no camelia hepatomegaly.  MSK/Ext: No clubbing or cyanosis.  Skin: Warm and dry, no rashes.  Neurological: CN III-XII grossly intact. No focal deficits.   Psych: A&O x 3, appropriate affect, good judgement      Assessment:     1. Dilated cardiomyopathy (HCC)     2. Essential hypertension, benign     3. Dyslipidemia     4. Chronic anticoagulation     5. Chronic atrial fibrillation (HCC)     6. Bilateral carotid artery stenosis     7. Palpitations     8. Coronary artery disease involving native coronary artery of native heart without angina pectoris     9. Dyspnea on exertion     10. Ischemic cardiomyopathy     11. Intermittent lightheadedness     12. Other chest pain     13. PAC (premature atrial contraction)         Medical Decision Making:  Today's Assessment / Status / Plan:       1. Atrial fibrillation, for was stopped due to peptic ulcer bleeding in November 2017, currently not on warfarin he is experiencing mild shortness of breath.   2. Dilated cardiomyopathy:  His EF has improved to 45%, does not need ICD and was deemed not to be candidate due to age.  3. Ascending a Lindsay dilatio mention on heart catheterization in 2010 but not seen on echocardiogram so I do not think he actually has this diagnosis.:   4. Hypertension: Blood pressure is high today but usually well controlled.  5. Hyperlipidemia:  Lipid panel looking good with diet control.  He is " not on statin therapy at this time and I would not start it at this poing.  6. Carotid artery plaque with less than 50% stenosis: Clinically stable, I don't think I would routinely follow this unless he has a CVA.  7. Breast cancer treated with anastrozole.  8. Atypical chest pain, not changed.  9. Chronic anticoagulation, prior GI bleed on NSAIDs, doing well so far, no bleeding  10. Constipation, better     -No medication changes today.  I think a conservative approach is best with the exception of the warfarin.  However if he has recurrence of serious bleeding and I likely would not retrial the warfarin going forward.  -BP was high today but usually controlled so no changes. Could consider amlodipine as next agent if needed.    Return in about 6 months (around 3/12/2019).    It is my pleasure to participate in the care of Mr. Garrett.  Please do not hesitate to contact me with questions or concerns.    Nikki Azevedo MD, [facility]  Cardiologist [facility]    Please note that this dictation was created using voice recognition software. I have made every reasonable attempt to correct obvious errors, but it is possible there are errors of grammar and possibly content that I did not discover before finalizing the note.      Alejandro Dallas M.D.  [address] [facility] [place] [address]  VIA Facsimile: [phone]

## 2018-09-20 ENCOUNTER — ANTICOAGULATION MONITORING (OUTPATIENT)
Dept: VASCULAR LAB | Facility: MEDICAL CENTER | Age: 83
End: 2018-09-20

## 2018-09-20 DIAGNOSIS — Z79.01 CHRONIC ANTICOAGULATION: ICD-10-CM

## 2018-09-20 LAB — INR PPP: 1.8 (ref 2–3.5)

## 2018-09-20 NOTE — PROGRESS NOTES
Anticoagulation Summary  As of 9/20/2018    INR goal:   2.0-3.0   TTR:   30.3 % (2.9 mo)   Today's INR:   1.8!   Warfarin maintenance plan:   4 mg (4 mg x 1), then 6 mg (4 mg x 1.5) repeating every 2 days   Average weekly warfarin total:   35 mg   Plan last modified:   Ingrid Mariscal PharmD (9/20/2018)   Next INR check:   10/4/2018   Target end date:   Indefinite    Indications    Atrial fibrillation (HCC) [I48.91]  Chronic anticoagulation [Z79.01]             Anticoagulation Episode Summary     INR check location:       Preferred lab:       Send INR reminders to:       Comments:         Anticoagulation Care Providers     Provider Role Specialty Phone number    [facility] Responsible  [phone]    Jerad Collins Responsible          Anticoagulation Patient Findings    Spoke with patient.  INR is SUB therapeutic.   Pt had more greens.  Pt denies any unusual s/s of bleeding, bruising, clotting or any changes to diet or medications. Denies any etoh, cranberries, supplements, or illness.   Pt verifies warfarin weekly dosing.     Will have pt take a boost dose of 6mg x1 and then resume his normal dosing of 6mg QOD with 4mg    Repeat INR in 2 week(s).     Ingrid Mariscal, PharmD

## 2018-10-04 LAB — INR PPP: 1.8 (ref 2–3.5)

## 2018-10-08 ENCOUNTER — ANTICOAGULATION MONITORING (OUTPATIENT)
Dept: VASCULAR LAB | Facility: MEDICAL CENTER | Age: 83
End: 2018-10-08

## 2018-10-08 DIAGNOSIS — Z79.01 CHRONIC ANTICOAGULATION: ICD-10-CM

## 2018-10-08 RX ORDER — WARFARIN SODIUM 2 MG/1
4-6 TABLET ORAL DAILY
Qty: 90 TAB | Refills: 3 | Status: SHIPPED
Start: 2018-10-08 | End: 2020-04-23

## 2018-10-08 NOTE — PROGRESS NOTES
Anticoagulation Summary  As of 10/8/2018    INR goal:   2.0-3.0   TTR:   26.1 % (3.4 mo)   Today's INR:   1.8! (10/4/2018)   Warfarin maintenance plan:   4 mg (2 mg x 2) on Mon, Wed, Fri; 6 mg (2 mg x 3) all other days   Weekly warfarin total:   36 mg   Plan last modified:   Ramon Queen, PharmD (10/8/2018)   Next INR check:      Target end date:   Indefinite    Indications    Atrial fibrillation (HCC) [I48.91]  Chronic anticoagulation [Z79.01]             Anticoagulation Episode Summary     INR check location:       Preferred lab:       Send INR reminders to:       Comments:         Anticoagulation Care Providers     Provider Role Specialty Phone number    [facility] Responsible  [phone]    Jerad Collins Responsible          Anticoagulation Patient Findings    Spoke to patient on the phone.   INR  sub-therapeutic.   Denies signs/symptoms of bleeding and/or thrombosis.   Denies changes to diet or medications.   Follow up appointment in 2 week(s).  He requested that I send in a new prescription for warfarin 2 mg tablets.    Increase weekly warfarin dose as noted      Ramon Queen, PharmD

## 2018-12-03 DIAGNOSIS — Z79.01 CHRONIC ANTICOAGULATION: ICD-10-CM

## 2018-12-03 DIAGNOSIS — I48.91 ATRIAL FIBRILLATION, UNSPECIFIED TYPE (HCC): ICD-10-CM

## 2018-12-03 DIAGNOSIS — I42.9 CARDIOMYOPATHY, UNSPECIFIED TYPE (HCC): ICD-10-CM

## 2018-12-03 RX ORDER — LISINOPRIL 10 MG/1
10 TABLET ORAL DAILY
Qty: 90 TAB | Refills: 2 | Status: SHIPPED | OUTPATIENT
Start: 2018-12-03 | End: 2020-04-23

## 2018-12-03 RX ORDER — METOPROLOL SUCCINATE 25 MG/1
50 TABLET, EXTENDED RELEASE ORAL DAILY
Qty: 180 TAB | Refills: 2 | Status: SHIPPED | OUTPATIENT
Start: 2018-12-03 | End: 2019-10-03

## 2018-12-13 ENCOUNTER — ANTICOAGULATION MONITORING (OUTPATIENT)
Dept: VASCULAR LAB | Facility: MEDICAL CENTER | Age: 83
End: 2018-12-13

## 2018-12-13 DIAGNOSIS — Z79.01 CHRONIC ANTICOAGULATION: ICD-10-CM

## 2018-12-13 DIAGNOSIS — I48.91 ATRIAL FIBRILLATION, UNSPECIFIED TYPE (HCC): ICD-10-CM

## 2018-12-13 LAB — INR PPP: 1.2 (ref 2–3.5)

## 2018-12-14 NOTE — PROGRESS NOTES
Anticoagulation Summary  As of 12/13/2018    INR goal:   2.0-3.0   TTR:   15.4 % (5.7 mo)   Today's INR:   1.2!   Warfarin maintenance plan:   4 mg (2 mg x 2) on Mon, Wed, Fri; 6 mg (2 mg x 3) all other days   Weekly warfarin total:   36 mg   Plan last modified:   Ramon Queen, PharmD (10/8/2018)   Next INR check:   12/20/2018   Target end date:   Indefinite    Indications    Atrial fibrillation (HCC) [I48.91]  Chronic anticoagulation [Z79.01]             Anticoagulation Episode Summary     INR check location:       Preferred lab:       Send INR reminders to:       Comments:         Anticoagulation Care Providers     Provider Role Specialty Phone number    [facility] Responsible  [phone]    Jerad Collins Responsible          Anticoagulation Patient Findings    HPI:  Tej Garrett, on anticoagulation therapy with warfarin for AF  Changes to current medical/health status since last appt: none  Denies signs/symptoms of bleeding and/or thrombosis since the last appt.    Denies any interval changes to diet  Denies any interval changes to medications since last appt.   Denies any complications or cost restrictions with current therapy.     A/P   INR  SUB-therapeutic.   Pt was only taking 2 mg of warfarin daily (he was told he was taking too much warfarin by another hospital), will double his regimen and follow up quickly. Pt unwilling to resume previous warfarin regimen.     Next INR in 4 days.     Pawan Chen, PharmD

## 2018-12-17 LAB — INR PPP: 1.4 (ref 2–3.5)

## 2018-12-19 ENCOUNTER — ANTICOAGULATION MONITORING (OUTPATIENT)
Dept: VASCULAR LAB | Facility: MEDICAL CENTER | Age: 83
End: 2018-12-19

## 2018-12-19 DIAGNOSIS — I48.91 ATRIAL FIBRILLATION, UNSPECIFIED TYPE (HCC): ICD-10-CM

## 2018-12-19 DIAGNOSIS — Z79.01 CHRONIC ANTICOAGULATION: ICD-10-CM

## 2018-12-19 NOTE — PROGRESS NOTES
Anticoagulation Summary  As of 12/19/2018    INR goal:   2.0-3.0   TTR:   15.0 % (5.8 mo)   Today's INR:   1.4! (12/17/2018)   Warfarin maintenance plan:   6 mg (2 mg x 3) on Wed; 4 mg (2 mg x 2) all other days   Weekly warfarin total:   30 mg   Plan last modified:   Herbie Rebollar PharmD (12/19/2018)   Next INR check:   12/26/2018   Target end date:   Indefinite    Indications    Atrial fibrillation (HCC) [I48.91]  Chronic anticoagulation [Z79.01]             Anticoagulation Episode Summary     INR check location:       Preferred lab:       Send INR reminders to:       Comments:         Anticoagulation Care Providers     Provider Role Specialty Phone number    [facility] Responsible  [phone]    Jerad Collins Responsible          Anticoagulation Patient Findings  Patient Findings     Negatives:   Signs/symptoms of thrombosis, Signs/symptoms of bleeding, Laboratory test error suspected, Change in health, Change in alcohol use, Change in activity, Upcoming invasive procedure, Emergency department visit, Upcoming dental procedure, Missed doses, Extra doses, Change in medications, Change in diet/appetite, Hospital admission, Bruising, Other complaints        Spoke with patient today regarding subtherapeutic INR of 1.4.  Patient denies any signs/symptoms of bruising or bleeding or any changes in diet and medications.  Instructed patient to call clinic with any questions or concerns.  Patient still resistant to dose increases, but is agreeable to slight increase as above.  Follow up in 1 weeks, to reduce risk of adverse events related to this high risk medication,  Warfarin.    Herbie Rebollar, PharmD

## 2018-12-26 ENCOUNTER — ANTICOAGULATION MONITORING (OUTPATIENT)
Dept: VASCULAR LAB | Facility: MEDICAL CENTER | Age: 83
End: 2018-12-26

## 2018-12-26 DIAGNOSIS — I48.91 ATRIAL FIBRILLATION, UNSPECIFIED TYPE (HCC): ICD-10-CM

## 2018-12-26 DIAGNOSIS — Z79.01 CHRONIC ANTICOAGULATION: ICD-10-CM

## 2018-12-26 LAB — INR PPP: 1.4 (ref 2–3.5)

## 2018-12-27 NOTE — PROGRESS NOTES
Anticoagulation Summary  As of 12/26/2018    INR goal:   2.0-3.0   TTR:   14.3 % (6.1 mo)   Today's INR:   1.4!   Warfarin maintenance plan:   6 mg (2 mg x 3) on Mon, Wed, Fri; 4 mg (2 mg x 2) all other days   Weekly warfarin total:   34 mg   Plan last modified:   Pawan Chen, PharmD (12/26/2018)   Next INR check:   1/2/2019   Target end date:   Indefinite    Indications    Atrial fibrillation (HCC) [I48.91]  Chronic anticoagulation [Z79.01]             Anticoagulation Episode Summary     INR check location:       Preferred lab:       Send INR reminders to:       Comments:         Anticoagulation Care Providers     Provider Role Specialty Phone number    [facility] Responsible  [phone]    Jerad Collins Responsible          Anticoagulation Patient Findings    HPI:  Tej Garrett, on anticoagulation therapy with warfarin for AF  Changes to current medical/health status since last appt: none  Denies signs/symptoms of bleeding and/or thrombosis since the last appt.    Denies any interval changes to diet  Denies any interval changes to medications since last appt.   Denies any complications or cost restrictions with current therapy.     A/P   INR  SUB-therapeutic.   Willing to increase regimen as shown above.     Next INR in 1 week(s).    Pawan Chen, PharmD

## 2019-01-02 LAB — INR PPP: 1.7 (ref 2–3.5)

## 2019-01-09 ENCOUNTER — ANTICOAGULATION MONITORING (OUTPATIENT)
Dept: VASCULAR LAB | Facility: MEDICAL CENTER | Age: 84
End: 2019-01-09

## 2019-01-09 DIAGNOSIS — I48.91 ATRIAL FIBRILLATION, UNSPECIFIED TYPE (HCC): ICD-10-CM

## 2019-01-09 DIAGNOSIS — Z79.01 CHRONIC ANTICOAGULATION: ICD-10-CM

## 2019-01-09 NOTE — PROGRESS NOTES
Anticoagulation Summary  As of 1/9/2019    INR goal:   2.0-3.0   TTR:   13.8 % (6.4 mo)   Today's INR:   1.7! (1/2/2019)   Warfarin maintenance plan:   6 mg (2 mg x 3) on Mon, Wed, Fri; 4 mg (2 mg x 2) all other days   Weekly warfarin total:   34 mg   Plan last modified:   Pawan Chen, PharmD (12/26/2018)   Next INR check:   1/16/2019   Target end date:   Indefinite    Indications    Atrial fibrillation (HCC) [I48.91]  Chronic anticoagulation [Z79.01]             Anticoagulation Episode Summary     INR check location:       Preferred lab:       Send INR reminders to:       Comments:         Anticoagulation Care Providers     Provider Role Specialty Phone number    [facility] Responsible  [phone]    Jerad Collins Responsible          Anticoagulation Patient Findings  Patient Findings     Negatives:   Signs/symptoms of thrombosis, Signs/symptoms of bleeding, Laboratory test error suspected, Change in health, Change in alcohol use, Change in activity, Upcoming invasive procedure, Emergency department visit, Upcoming dental procedure, Missed doses, Extra doses, Change in medications, Change in diet/appetite, Hospital admission, Bruising, Other complaints        Spoke with patient today regarding subtherapeutic INR of 1.7.  Patient denies any signs/symptoms of bruising or bleeding or any changes in diet and medications.  Instructed patient to call clinic with any questions or concerns.  Patient still unwilling to increase weekly dose, he is agreeable to a one time increase in tomorrow's dosing to 6mg.  Follow up in 1 weeks, to reduce risk of adverse events related to this high risk medication,  Warfarin.    Herbie Rebollar, CharD

## 2019-01-16 LAB
INR PPP: 1.6 (ref 2–3.5)
INR PPP: 1.6 (ref 2–3.5)

## 2019-01-17 ENCOUNTER — ANTICOAGULATION MONITORING (OUTPATIENT)
Dept: VASCULAR LAB | Facility: MEDICAL CENTER | Age: 84
End: 2019-01-17

## 2019-01-17 DIAGNOSIS — I48.91 ATRIAL FIBRILLATION, UNSPECIFIED TYPE (HCC): ICD-10-CM

## 2019-01-17 DIAGNOSIS — Z79.01 CHRONIC ANTICOAGULATION: ICD-10-CM

## 2019-01-17 NOTE — PROGRESS NOTES
Anticoagulation Summary  As of 1/17/2019    INR goal:   2.0-3.0   TTR:   12.8 % (6.8 mo)   Today's INR:   1.6! (1/16/2019)   Warfarin maintenance plan:   4 mg (2 mg x 2) on Tue, Thu; 6 mg (2 mg x 3) all other days   Weekly warfarin total:   38 mg   Plan last modified:   Pawan Chen, PharmD (1/17/2019)   Next INR check:   1/24/2019   Target end date:   Indefinite    Indications    Atrial fibrillation (HCC) [I48.91]  Chronic anticoagulation [Z79.01]             Anticoagulation Episode Summary     INR check location:       Preferred lab:       Send INR reminders to:       Comments:         Anticoagulation Care Providers     Provider Role Specialty Phone number    [facility] Responsible  [phone]    Jerad Collins Responsible          Anticoagulation Patient Findings     Left voicemail message to report a SUB therapeutic INR of 1.6.  Pt to begin increased warfarin dosing regimen. Requested pt contact the clinic for any s/s of unusual bleeding, bruising, clotting or any changes to diet or medication. FU 1 weeks.    Pawan Chen, PharmD

## 2019-01-20 LAB — INR PPP: 1.5 (ref 2–3.5)

## 2019-01-22 ENCOUNTER — ANTICOAGULATION MONITORING (OUTPATIENT)
Dept: VASCULAR LAB | Facility: MEDICAL CENTER | Age: 84
End: 2019-01-22

## 2019-01-22 DIAGNOSIS — Z79.01 CHRONIC ANTICOAGULATION: ICD-10-CM

## 2019-01-23 NOTE — PROGRESS NOTES
Anticoagulation Summary  As of 1/22/2019    INR goal:   2.0-3.0   TTR:   12.6 % (7 mo)   Today's INR:   No new INR was available at the time of this encounter.   Warfarin maintenance plan:   4 mg (2 mg x 2) on Tue, Thu; 6 mg (2 mg x 3) all other days   Weekly warfarin total:   38 mg   Plan last modified:   Jory Ramos AKleberPKleberNKleber (1/22/2019)   Next INR check:   1/30/2019   Target end date:   Indefinite    Indications    Atrial fibrillation (HCC) [I48.91]  Chronic anticoagulation [Z79.01]             Anticoagulation Episode Summary     INR check location:       Preferred lab:       Send INR reminders to:       Comments:         Anticoagulation Care Providers     Provider Role Specialty Phone number    [facility] Responsible  [phone]    Jerad Collins Responsible          Anticoagulation Patient Findings    Pt is subtherapeutic today. Denies any changes in medications or diet. Med rec completed and reviewed. Patient denies any s/sx of bleeding or clotting. Pt was taking warfarin differently than previously documented.  Will continue dosing as outlined in calendar. Will follow-up with pt in 1 week.    Jory MCARTHUR  [facility]

## 2019-01-30 LAB — INR PPP: 1.5 (ref 2–3.5)

## 2019-01-31 ENCOUNTER — ANTICOAGULATION MONITORING (OUTPATIENT)
Dept: MEDICAL GROUP | Facility: PHYSICIAN GROUP | Age: 84
End: 2019-01-31

## 2019-01-31 DIAGNOSIS — I48.91 ATRIAL FIBRILLATION, UNSPECIFIED TYPE (HCC): ICD-10-CM

## 2019-01-31 DIAGNOSIS — Z79.01 CHRONIC ANTICOAGULATION: ICD-10-CM

## 2019-01-31 NOTE — PROGRESS NOTES
Anticoagulation Summary  As of 2019    INR goal:   2.0-3.0   TTR:   12.0 % (7.3 mo)   INR used for dosin.5! (2019)   Warfarin maintenance plan:   6 mg (2 mg x 3) every day   Weekly warfarin total:   42 mg   Plan last modified:   Thai Barton, Pharmacy Intern (2019)   Next INR check:   2019   Target end date:   Indefinite    Indications    Atrial fibrillation (HCC) [I48.91]  Chronic anticoagulation [Z79.01]             Anticoagulation Episode Summary     INR check location:       Preferred lab:       Send INR reminders to:       Comments:         Anticoagulation Care Providers     Provider Role Specialty Phone number    [facility] Responsible  [phone]    Jerad Collins Responsible          Anticoagulation Patient Findings      Spoke with Isaias.  INR is sub therapeutic.   Pt denies any unusual s/s of bleeding, bruising, clotting or any changes to diet or medications. Denies any etoh, cranberries, supplements, or illness.   Pt verifies warfarin weekly dosing.   CHADSVASC = 2  Clot hx none    Will have pt increase weekly warfarin regimen by 10.5% and take 6 mg every day.     Repeat INR in 1 week(s).     Thai Barton, Pharmacy Intern

## 2019-02-06 LAB — INR PPP: 1.8 (ref 2–3.5)

## 2019-02-07 ENCOUNTER — ANTICOAGULATION MONITORING (OUTPATIENT)
Dept: VASCULAR LAB | Facility: MEDICAL CENTER | Age: 84
End: 2019-02-07

## 2019-02-07 DIAGNOSIS — Z79.01 CHRONIC ANTICOAGULATION: ICD-10-CM

## 2019-02-07 DIAGNOSIS — I48.91 ATRIAL FIBRILLATION, UNSPECIFIED TYPE (HCC): ICD-10-CM

## 2019-02-08 NOTE — PROGRESS NOTES
Anticoagulation Summary  As of 2019    INR goal:   2.0-3.0   TTR:   11.7 % (7.5 mo)   INR used for dosin.8! (2019)   Warfarin maintenance plan:   8 mg (2 mg x 4) every Mon, Fri; 6 mg (2 mg x 3) all other days   Weekly warfarin total:   46 mg   Plan last modified:   Pawan Chen, PharmD (2019)   Next INR check:   2019   Target end date:   Indefinite    Indications    Atrial fibrillation (HCC) [I48.91]  Chronic anticoagulation [Z79.01]             Anticoagulation Episode Summary     INR check location:       Preferred lab:       Send INR reminders to:       Comments:         Anticoagulation Care Providers     Provider Role Specialty Phone number    [facility] Responsible  [phone]    Jerad Collins Responsible          Anticoagulation Patient Findings     Left voicemail message to report a SUB therapeutic INR of 1.8.  Pt to begin increased regimen then continue with current warfarin dosing regimen. Requested pt contact the clinic for any s/s of unusual bleeding, bruising, clotting or any changes to diet or medication. FU 1 weeks.    Requested pt to call the clinic to discuss initiating enoxaparin.  Pt has been chronically low with his INR.     Pawan Chen, PharmD

## 2019-02-13 LAB — INR PPP: 1.9 (ref 2–3.5)

## 2019-02-14 ENCOUNTER — ANTICOAGULATION MONITORING (OUTPATIENT)
Dept: VASCULAR LAB | Facility: MEDICAL CENTER | Age: 84
End: 2019-02-14

## 2019-02-14 DIAGNOSIS — Z79.01 CHRONIC ANTICOAGULATION: ICD-10-CM

## 2019-02-14 DIAGNOSIS — I48.91 ATRIAL FIBRILLATION, UNSPECIFIED TYPE (HCC): ICD-10-CM

## 2019-02-14 NOTE — PROGRESS NOTES
Anticoagulation Summary  As of 2019    INR goal:   2.0-3.0   TTR:   11.3 % (7.8 mo)   INR used for dosin.9! (2019)   Warfarin maintenance plan:   8 mg (2 mg x 4) every Tue, Thu, Sat; 6 mg (2 mg x 3) all other days   Weekly warfarin total:   48 mg   Plan last modified:   Pawan Chen, PharmD (2019)   Next INR check:   2019   Target end date:   Indefinite    Indications    Atrial fibrillation (HCC) [I48.91]  Chronic anticoagulation [Z79.01]             Anticoagulation Episode Summary     INR check location:       Preferred lab:       Send INR reminders to:       Comments:         Anticoagulation Care Providers     Provider Role Specialty Phone number    [facility] Responsible  [phone]    Jerad Collins Responsible          Anticoagulation Patient Findings    HPI:  Tej Garrett, on anticoagulation therapy with warfarin for AFib.   Changes to current medical/health status since last appt: none  Denies signs/symptoms of bleeding and/or thrombosis since the last appt.    Denies any interval changes to diet  Denies any interval changes to medications since last appt.   Denies any complications or cost restrictions with current therapy.     A/P   INR  SUB-therapeutic.   Begin increased regimen.     Next INR in 1 week(s).    Pawan Chen, CharD

## 2019-02-20 LAB — INR PPP: 2.8 (ref 2–3.5)

## 2019-02-22 ENCOUNTER — ANTICOAGULATION MONITORING (OUTPATIENT)
Dept: VASCULAR LAB | Facility: MEDICAL CENTER | Age: 84
End: 2019-02-22

## 2019-02-22 DIAGNOSIS — I48.91 ATRIAL FIBRILLATION, UNSPECIFIED TYPE (HCC): ICD-10-CM

## 2019-02-22 DIAGNOSIS — Z79.01 CHRONIC ANTICOAGULATION: ICD-10-CM

## 2019-02-23 NOTE — PROGRESS NOTES
Anticoagulation Summary  As of 2019    INR goal:   2.0-3.0   TTR:   13.6 % (8 mo)   INR used for dosin.8 (2019)   Warfarin maintenance plan:   8 mg (2 mg x 4) every Tue, Thu, Sat; 6 mg (2 mg x 3) all other days   Weekly warfarin total:   48 mg   Plan last modified:   Char GonzalezD (2019)   Next INR check:   2019   Target end date:   Indefinite    Indications    Atrial fibrillation (HCC) [I48.91]  Chronic anticoagulation [Z79.01]             Anticoagulation Episode Summary     INR check location:       Preferred lab:       Send INR reminders to:       Comments:         Anticoagulation Care Providers     Provider Role Specialty Phone number    [facility] Responsible  [phone]    Jerad Collins Responsible          Anticoagulation Patient Findings  Patient Findings     Negatives:   Signs/symptoms of thrombosis, Signs/symptoms of bleeding, Laboratory test error suspected, Change in health, Change in alcohol use, Change in activity, Upcoming invasive procedure, Emergency department visit, Upcoming dental procedure, Missed doses, Extra doses, Change in medications, Change in diet/appetite, Hospital admission, Bruising, Other complaints        Spoke with the patient on the phone today, reporting a therapeutic INR of 2.8. Confirmed the current warfarin dosing regimen and patient compliance. Patient was taking it a higher doses on opposite days, but will resume the correct dosing. Patient denies any interval changes to diet and/or medications. Patient denies any signs/symptoms of bleeding or clotting.  Patient will follow up again in 1 week.    Nakia PardoD

## 2019-02-27 ENCOUNTER — ANTICOAGULATION MONITORING (OUTPATIENT)
Dept: VASCULAR LAB | Facility: MEDICAL CENTER | Age: 84
End: 2019-02-27

## 2019-02-27 DIAGNOSIS — Z79.01 CHRONIC ANTICOAGULATION: ICD-10-CM

## 2019-02-27 DIAGNOSIS — I48.91 ATRIAL FIBRILLATION, UNSPECIFIED TYPE (HCC): ICD-10-CM

## 2019-02-27 LAB — INR PPP: 2.7 (ref 2–3.5)

## 2019-02-28 NOTE — PROGRESS NOTES
Anticoagulation Summary  As of 2019    INR goal:   2.0-3.0   TTR:   16.0 % (8.2 mo)   INR used for dosin.7 (2019)   Warfarin maintenance plan:   8 mg (2 mg x 4) every Tue, Thu, Sat; 6 mg (2 mg x 3) all other days   Weekly warfarin total:   48 mg   No change documented:   Castillo Charlton Ass't   Plan last modified:   Pawan Chen, PharmD (2019)   Next INR check:   3/6/2019   Target end date:   Indefinite    Indications    Atrial fibrillation (HCC) [I48.91]  Chronic anticoagulation [Z79.01]             Anticoagulation Episode Summary     INR check location:       Preferred lab:       Send INR reminders to:       Comments:         Anticoagulation Care Providers     Provider Role Specialty Phone number    [facility] Responsible  [phone]    Jerad Collins Responsible          Anticoagulation Patient Findings  Patient Findings     Negatives:   Signs/symptoms of thrombosis, Signs/symptoms of bleeding, Laboratory test error suspected, Change in health, Change in alcohol use, Change in activity, Upcoming invasive procedure, Emergency department visit, Upcoming dental procedure, Missed doses, Extra doses, Change in medications, Change in diet/appetite, Hospital admission, Bruising, Other complaints      Spoke with patient to report a therapeutic INR.  Pt instructed to continue with current warfarin dosing regimen. Pt denies any s/s of bleeding, bruising, clotting or any changes to diet or medication.  Will follow up in 2 weeks.  Castillo Charlton Ass't    I have reviewed and am in agreement with the above stated plan on 19.  Herbie Rebollar, CharD

## 2019-03-12 ENCOUNTER — OFFICE VISIT (OUTPATIENT)
Dept: CARDIOLOGY | Facility: PHYSICIAN GROUP | Age: 84
End: 2019-03-12
Payer: MEDICARE

## 2019-03-12 VITALS
OXYGEN SATURATION: 99 % | HEART RATE: 70 BPM | HEIGHT: 76 IN | SYSTOLIC BLOOD PRESSURE: 128 MMHG | BODY MASS INDEX: 20.82 KG/M2 | WEIGHT: 171 LBS | DIASTOLIC BLOOD PRESSURE: 80 MMHG

## 2019-03-12 DIAGNOSIS — R93.1 DECREASED CARDIAC EJECTION FRACTION: ICD-10-CM

## 2019-03-12 DIAGNOSIS — I25.10 CORONARY ARTERY DISEASE INVOLVING NATIVE CORONARY ARTERY OF NATIVE HEART WITHOUT ANGINA PECTORIS: ICD-10-CM

## 2019-03-12 DIAGNOSIS — I25.5 ISCHEMIC CARDIOMYOPATHY: ICD-10-CM

## 2019-03-12 DIAGNOSIS — I42.0 DILATED CARDIOMYOPATHY (HCC): ICD-10-CM

## 2019-03-12 DIAGNOSIS — R42 INTERMITTENT LIGHTHEADEDNESS: ICD-10-CM

## 2019-03-12 DIAGNOSIS — I49.1 PAC (PREMATURE ATRIAL CONTRACTION): ICD-10-CM

## 2019-03-12 DIAGNOSIS — I65.23 BILATERAL CAROTID ARTERY STENOSIS: ICD-10-CM

## 2019-03-12 DIAGNOSIS — R07.89 OTHER CHEST PAIN: ICD-10-CM

## 2019-03-12 DIAGNOSIS — R06.09 DYSPNEA ON EXERTION: ICD-10-CM

## 2019-03-12 DIAGNOSIS — R00.2 PALPITATIONS: ICD-10-CM

## 2019-03-12 DIAGNOSIS — I10 ESSENTIAL HYPERTENSION, BENIGN: ICD-10-CM

## 2019-03-12 DIAGNOSIS — E78.5 DYSLIPIDEMIA: ICD-10-CM

## 2019-03-12 DIAGNOSIS — I48.20 CHRONIC ATRIAL FIBRILLATION (HCC): ICD-10-CM

## 2019-03-12 PROCEDURE — 99214 OFFICE O/P EST MOD 30 MIN: CPT | Performed by: INTERNAL MEDICINE

## 2019-03-12 NOTE — LETTER
[facility] [address]  Phone: [phone]  Fax: [phone]              Tej Garrett  9/6/1928    Encounter Date: 3/12/2019    Nikki Azevedo M.D.          PROGRESS NOTE:  Subjective:   Chief Complaint:   Chief Complaint   Patient presents with   • Cardiomyopathy (Non-ischemic)   • Atrial Fibrillation       Tej Garrett is a 89 y.o. male who presents for follow-up of a nonischemic dilated cardiomyopathy, chronic atrial fibrillation and was previously on chronic anticoagulation.  He has had 2 heart catheterizations and has nonobstructive disease.  His EF came up to 30-45% so ICD was not needed.  He has been in chronic atrial fibrillation.  An echocardiogram in November 2018 and his EF is reduced to 25-30%. He was in the hospital with CHF.  Now on lasix 40 mg again. Checking his weight, dry weight about 162 pounds.    Blood pressure controlled today.  No bleeding on blood thinner.  LDL cholesterol 47.    He reports fatigue walking to the mailbox but didn't think of it as AYALA.  No orthopnea, no LE edema. [facility] 3 AYALA.  He is not limited by chest pain or pressure.  He does not have dizziness or light headedness. Had only one episode of severe dizziness and his blood pressure was low at that time.  The past over time.  No orthopnea or significant lower extremity swelling.   He does get chronic upper extremity numbness and tingling which is typically worse on the right and worse after laying down which I think is mechanical.     His previous heart catheterization in 2010 said he had a moderately dilated root but this was not seen on echocardiogram.  He has carotid artery plaque but no stenosis, this was in June 2017.    November 2017 he was hospitalized for bleeding ulcer.  He was on naproxen at the time.  Naproxen has been stopped and he has been on omeprazole.  He has  been rechallenged with warfarin after I  spoke with his primary care physician Dr Dallas.   He has not had any further issues with bleeding.  His blood pressures at home are controlled.  His INRs have been in range.     DATA REVIEWED by me:  ECG 9-12-18 afib, rate 89, NSIVCD, LAFB vs old inferior mi    ECG 4/27/2017 atrial fibrillation, rate 83, nonspecific intraventricular conduction delay with left anterior fascicular block, PVCs, delayed R-wave progression    Echocardiogram 11/15/2018  EF 25-30%, moderate concentric LVH, probable diastolic dysfunction in the setting of A. fib, severely dilated left atrium, moderate mitral annular calcification with mild MR, aortic valve sclerosis, RVSP 25 mmHg, RV mildly dilated     Echo 5/21/2018 EF 45%, increased from 30% in 2017, probable diastolic dysfunction in the setting of A. fib, RV systolic function normal, mild MR, mild AI, moderately dilated left atrium, RVSP 30 mmHg     Carotid ultrasound 6/21/2017 mild and moderate plaque with a less than 50% stenosis     [facility] 4-20-17:  POSTPROCEDURE DIAGNOSES:  1.  No angiographic evidence of coronary artery disease.  2.  Severely reduced left ventricular systolic function with ejection fraction of 20%.  3.  Elevated left ventricular end-diastolic pressure.     Nuclear stress test 3/29/2017, small fixed apical defect.     Most recent labs:    5/21/2018 creatinine 1.23, GFR 55, sodium 141, potassium 4.2, LFTs normal, total cholesterol 120, HDL 62, LDL 47, triglycerides 54     9/2018 hemoglobin 12.7, hematocrit 38.5, .8, platelets 125  INR 8-23-18 2.1  August 1, 2018 hemoglobin 13.1, hematocrit 39.3, platelets 137, creatinine 1.28, GFR 53, sodium 141, potassium 3.9, LFTs normal      Past Medical History:   Diagnosis Date   • Arrhythmia     A-fib.   • Arthritis    • Asthma    • Benign essential hypertension 11/23/2009   • Bowel habit changes     Constipation   • Breath shortness    • Cancer (HCC) ?2014    Bladder and breast.   • Cataract    • Chest tightness or  pressure 11/23/2009   • Dental disorder     Lower dentures.   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   • PAC (premature atrial contraction) 11/23/2009   • Pain     Back and hip.   • Palpitations 11/23/2009   • Pneumonia     Around 2010.   • prostate     TURP     Past Surgical History:   Procedure Laterality Date   • LARA BY LAPAROSCOPY  8/8/2010    Performed by IMMANUEL GARCIA at SURGERY [facility] ORS   • UMBILICAL HERNIA REPAIR  8/8/2010    Performed by IMMANUEL GARCIA at SURGERY [facility] ORS   • OTHER      Bladder tumor removed   • OTHER  ?2012    Left breast cancer (no nipple).     History reviewed. No pertinent family history.  Social History     Social History   • Marital status: Single     Spouse name: N/A   • Number of children: N/A   • Years of education: N/A     Occupational History   • Not on file.     Social History Main Topics   • Smoking status: Former Smoker     Packs/day: 2.00     Years: 30.00     Quit date: 6/1/1998   • Smokeless tobacco: Never Used      Comment: Quit 10years ago   • Alcohol use Yes      Comment: One glass a wine daily. 1-2/daily and sometimes whiskey and/or beer.   • Drug use: No   • Sexual activity: Not on file     Other Topics Concern   • Not on file     Social History Narrative   • No narrative on file     Allergies   Allergen Reactions   • Antihistamine Decongestant [Dexbrompheniramine-Pseudoeph] Palpitations       Current Outpatient Prescriptions   Medication Sig Dispense Refill   • lisinopril (PRINIVIL) 10 MG Tab Take 1 Tab by mouth every day. 90 Tab 2   • metoprolol SR (TOPROL XL) 25 MG TABLET SR 24 HR Take 2 Tabs by mouth every day. 180 Tab 2   • warfarin (COUMADIN) 2 MG Tab Take 2-3 Tabs by mouth every day. (Patient taking differently: Take 6-8 mg by mouth every day.) 90 Tab 3   • aspirin 81 MG tablet Take 81 mg by mouth every day.     • Sennosides (SENNA) 8.6 MG Tab Take 1 Tab by  "mouth every bedtime.     • CALCIUM PO Take  by mouth.     • omeprazole (PRILOSEC) 40 MG capsule Take 40 mg by mouth every day.     • anastrozole (ARIMIDEX) 1 MG TABS Take 1 mg by mouth every day.     • Ferrous Sulfate (IRON) 325 (65 FE) MG TABS Take  by mouth.     • magnesium chloride SR (SLOW-MAG) 535 (64 MG) MG TBCR Take 535 mg by mouth 2 Times a Day.       • fluoxetine (PROZAC) 20 MG CAPS Take 20 mg by mouth every Monday, Wednesday, and Friday.       No current facility-administered medications for this visit.        ROS    All others systems reviewed and negative.     Objective:     Blood pressure 128/80, pulse 70, height 1.918 m (6' 3.5\"), weight 77.6 kg (171 lb), SpO2 99 %. Body mass index is 21.09 kg/m².    Physical Exam   General: No acute distress. Well nourished.   appears younger than stated age.  HEENT: EOM grossly intact, no scleral icterus, no pharyngeal erythema.   Neck:  No JVD at 90, no bruits, trachea midline  CVS: Irreg irreg. 2/6 sys murmur RSB to apex. No LE edema.  2+ radial pulses, 1+ DP pulses  Resp: CTAB. No wheezing or crackles/rhonchi. Normal respiratory effort.  Abdomen: Soft, NT, no camelia hepatomegaly.  MSK/Ext: No clubbing or cyanosis. Kyphosis present  Skin: Warm and dry, no rashes.  Neurological: CN III-XII grossly intact. No focal deficits.   Psych: A&O x 3, appropriate affect, good judgement  Physical exam performed today and unchanged compared to 9-12-18.      Assessment:     1. Dilated cardiomyopathy (HCC)     2. Chronic atrial fibrillation (HCC)     3. Bilateral carotid artery stenosis     4. Essential hypertension, benign     5. Dyslipidemia     6. Palpitations     7. Dyspnea on exertion     8. Coronary artery disease involving native coronary artery of native heart without angina pectoris     9. Ischemic cardiomyopathy     10. Intermittent lightheadedness     11. PAC (premature atrial contraction)     12. Decreased cardiac ejection fraction     13. Other chest pain         "       Medical Decision Making:  Today's Assessment / Status / Plan:       1. Atrial fibrillation, for was stopped due to peptic ulcer bleeding in November 2017, currently not on warfarin he is experiencing mild shortness of breath.   2. Dilated cardiomyopathy:  His EF has improved to 45%, does not need ICD and was deemed not to be candidate due to age.  3. Ascending a Lindsay dilatio mention on heart catheterization in 2010 but not seen on echocardiogram so I do not think he actually has this diagnosis.:   4. Hypertension: Blood pressure is high today but usually well controlled.  5. Hyperlipidemia:  Lipid panel looking good with diet control.  He is not on statin therapy at this time and I would not start it at this poing.  6. Carotid artery plaque with less than 50% stenosis: Clinically stable, I don't think I would routinely follow this unless he has a CVA.  7. Breast cancer treated with anastrozole.  8. Atypical chest pain, not changed.  9. Chronic anticoagulation, prior GI bleed on NSAIDs, doing well so far, no bleeding  10. Constipation, better     -No medication changes today.  I think a conservative approach is best with the exception of the warfarin.  However if he has recurrence of serious bleeding and I likely would not retrial the warfarin going forward.  -He is on adequate heart failure medications, I think adding back the Lasix was the right thing to do.  -Cont checking weight and call our office if 165.  -If his BP is low at times, hold lisinopril (I wrote down on his paperwork today)  -Consider spirionolactone 12.5 mg daily for low EF  -No plans for ICD, already saw Dr. Matthew  -Not likely candidate for BiV pacing, has RBBB morphology.    -RTC 6 months, BMP at that time and now    Written instructions given today:  -Blood work, non fasting 1 week and 6 months just before you return.  -Call office if your weight reaches 165 pounds.  -If you have a low blood pressure day, hold the lisinopril.  If if  persists, call the cardiology office in [place]: [phone].    Return in about 6 months (around 9/12/2019).    It is my pleasure to participate in the care of Mr. Garrett.  Please do not hesitate to contact me with questions or concerns.    Nikki Azevedo MD, [facility]  Cardiologist [facility]    Please note that this dictation was created using voice recognition software. I have made every reasonable attempt to correct obvious errors, but it is possible there are errors of grammar and possibly content that I did not discover before finalizing the note.      Alejandro Dallas M.D.  [address] [facility] [address]  VIA Facsimile: [phone]

## 2019-03-12 NOTE — PATIENT INSTRUCTIONS
-Blood work, non fasting 1 week and 6 months just before you return.  -Call office if your weight reaches 165 pounds.  -If you have a low blood pressure day, hold the lisinopril.  If if persists, call the cardiology office in [place]: [phone].

## 2019-03-12 NOTE — PROGRESS NOTES
Subjective:   Chief Complaint:   Chief Complaint   Patient presents with   • Cardiomyopathy (Non-ischemic)   • Atrial Fibrillation       Tej Garrett is a 89 y.o. male who presents for follow-up of a nonischemic dilated cardiomyopathy, chronic atrial fibrillation and was previously on chronic anticoagulation.  He has had 2 heart catheterizations and has nonobstructive disease.  His EF came up to 30-45% so ICD was not needed.  He has been in chronic atrial fibrillation.  An echocardiogram in November 2018 and his EF is reduced to 25-30%. He was in the hospital with CHF.  Now on lasix 40 mg again. Checking his weight, dry weight about 162 pounds.    Blood pressure controlled today.  No bleeding on blood thinner.  LDL cholesterol 47.    He reports fatigue walking to the mailbox but didn't think of it as AYALA.  No orthopnea, no LE edema. [facility] 3 AYALA.  He is not limited by chest pain or pressure.  He does not have dizziness or light headedness. Had only one episode of severe dizziness and his blood pressure was low at that time.  The past over time.  No orthopnea or significant lower extremity swelling.   He does get chronic upper extremity numbness and tingling which is typically worse on the right and worse after laying down which I think is mechanical.     His previous heart catheterization in 2010 said he had a moderately dilated root but this was not seen on echocardiogram.  He has carotid artery plaque but no stenosis, this was in June 2017.    November 2017 he was hospitalized for bleeding ulcer.  He was on naproxen at the time.  Naproxen has been stopped and he has been on omeprazole.  He has  been rechallenged with warfarin after I spoke with his primary care physician Dr Dallas.   He has not had any further issues with bleeding.  His blood pressures at home are controlled.  His INRs have been in range.     DATA REVIEWED by me:  ECG 9-12-18 afib, rate 89, NSIVCD, LAFB vs old inferior mi    ECG 4/27/2017  atrial fibrillation, rate 83, nonspecific intraventricular conduction delay with left anterior fascicular block, PVCs, delayed R-wave progression    Echocardiogram 11/15/2018  EF 25-30%, moderate concentric LVH, probable diastolic dysfunction in the setting of A. fib, severely dilated left atrium, moderate mitral annular calcification with mild MR, aortic valve sclerosis, RVSP 25 mmHg, RV mildly dilated     Echo 5/21/2018 EF 45%, increased from 30% in 2017, probable diastolic dysfunction in the setting of A. fib, RV systolic function normal, mild MR, mild AI, moderately dilated left atrium, RVSP 30 mmHg     Carotid ultrasound 6/21/2017 mild and moderate plaque with a less than 50% stenosis     [facility] 4-20-17:  POSTPROCEDURE DIAGNOSES:  1.  No angiographic evidence of coronary artery disease.  2.  Severely reduced left ventricular systolic function with ejection fraction of 20%.  3.  Elevated left ventricular end-diastolic pressure.     Nuclear stress test 3/29/2017, small fixed apical defect.     Most recent labs:    5/21/2018 creatinine 1.23, GFR 55, sodium 141, potassium 4.2, LFTs normal, total cholesterol 120, HDL 62, LDL 47, triglycerides 54     9/2018 hemoglobin 12.7, hematocrit 38.5, .8, platelets 125  INR 8-23-18 2.1  August 1, 2018 hemoglobin 13.1, hematocrit 39.3, platelets 137, creatinine 1.28, GFR 53, sodium 141, potassium 3.9, LFTs normal      Past Medical History:   Diagnosis Date   • Arrhythmia     A-fib.   • Arthritis    • Asthma    • Benign essential hypertension 11/23/2009   • Bowel habit changes     Constipation   • Breath shortness    • Cancer (HCC) ?2014    Bladder and breast.   • Cataract    • Chest tightness or pressure 11/23/2009   • Dental disorder     Lower dentures.   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   • PAC (premature atrial contraction) 11/23/2009   • Pain     Back and hip.   • Palpitations  11/23/2009   • Pneumonia     Around 2010.   • prostate     TURP     Past Surgical History:   Procedure Laterality Date   • LARA BY LAPAROSCOPY  8/8/2010    Performed by IMMANUEL GARCIA at SURGERY [facility] ORS   • UMBILICAL HERNIA REPAIR  8/8/2010    Performed by IMMANUEL GARCIA at SURGERY [facility] ORS   • OTHER      Bladder tumor removed   • OTHER  ?2012    Left breast cancer (no nipple).     History reviewed. No pertinent family history.  Social History     Social History   • Marital status: Single     Spouse name: N/A   • Number of children: N/A   • Years of education: N/A     Occupational History   • Not on file.     Social History Main Topics   • Smoking status: Former Smoker     Packs/day: 2.00     Years: 30.00     Quit date: 6/1/1998   • Smokeless tobacco: Never Used      Comment: Quit 10years ago   • Alcohol use Yes      Comment: One glass a wine daily. 1-2/daily and sometimes whiskey and/or beer.   • Drug use: No   • Sexual activity: Not on file     Other Topics Concern   • Not on file     Social History Narrative   • No narrative on file     Allergies   Allergen Reactions   • Antihistamine Decongestant [Dexbrompheniramine-Pseudoeph] Palpitations       Current Outpatient Prescriptions   Medication Sig Dispense Refill   • lisinopril (PRINIVIL) 10 MG Tab Take 1 Tab by mouth every day. 90 Tab 2   • metoprolol SR (TOPROL XL) 25 MG TABLET SR 24 HR Take 2 Tabs by mouth every day. 180 Tab 2   • warfarin (COUMADIN) 2 MG Tab Take 2-3 Tabs by mouth every day. (Patient taking differently: Take 6-8 mg by mouth every day.) 90 Tab 3   • aspirin 81 MG tablet Take 81 mg by mouth every day.     • Sennosides (SENNA) 8.6 MG Tab Take 1 Tab by mouth every bedtime.     • CALCIUM PO Take  by mouth.     • omeprazole (PRILOSEC) 40 MG capsule Take 40 mg by mouth every day.     • anastrozole (ARIMIDEX) 1 MG TABS Take 1 mg by mouth every day.     • Ferrous Sulfate (IRON) 325 (65 FE) MG TABS Take  by mouth.     • magnesium  "chloride SR (SLOW-MAG) 535 (64 MG) MG TBCR Take 535 mg by mouth 2 Times a Day.       • fluoxetine (PROZAC) 20 MG CAPS Take 20 mg by mouth every Monday, Wednesday, and Friday.       No current facility-administered medications for this visit.        ROS    All others systems reviewed and negative.     Objective:     Blood pressure 128/80, pulse 70, height 1.918 m (6' 3.5\"), weight 77.6 kg (171 lb), SpO2 99 %. Body mass index is 21.09 kg/m².    Physical Exam   General: No acute distress. Well nourished.   appears younger than stated age.  HEENT: EOM grossly intact, no scleral icterus, no pharyngeal erythema.   Neck:  No JVD at 90, no bruits, trachea midline  CVS: Irreg irreg. 2/6 sys murmur RSB to apex. No LE edema.  2+ radial pulses, 1+ DP pulses  Resp: CTAB. No wheezing or crackles/rhonchi. Normal respiratory effort.  Abdomen: Soft, NT, no camelia hepatomegaly.  MSK/Ext: No clubbing or cyanosis. Kyphosis present  Skin: Warm and dry, no rashes.  Neurological: CN III-XII grossly intact. No focal deficits.   Psych: A&O x 3, appropriate affect, good judgement  Physical exam performed today and unchanged compared to 9-12-18.      Assessment:     1. Dilated cardiomyopathy (HCC)  Basic Metabolic Panel    Basic Metabolic Panel   2. Chronic atrial fibrillation (HCC)     3. Bilateral carotid artery stenosis     4. Essential hypertension, benign     5. Dyslipidemia     6. Palpitations     7. Dyspnea on exertion     8. Coronary artery disease involving native coronary artery of native heart without angina pectoris     9. Ischemic cardiomyopathy     10. Intermittent lightheadedness     11. PAC (premature atrial contraction)     12. Decreased cardiac ejection fraction     13. Other chest pain         Medical Decision Making:  Today's Assessment / Status / Plan:       1. Atrial fibrillation, for was stopped due to peptic ulcer bleeding in November 2017, currently not on warfarin he is experiencing mild shortness of breath.   2. " Dilated cardiomyopathy:  His EF has improved to 45%, does not need ICD and was deemed not to be candidate due to age.  3. Ascending a Lindsay dilatio mention on heart catheterization in 2010 but not seen on echocardiogram so I do not think he actually has this diagnosis.:   4. Hypertension: Blood pressure is high today but usually well controlled.  5. Hyperlipidemia:  Lipid panel looking good with diet control.  He is not on statin therapy at this time and I would not start it at this poing.  6. Carotid artery plaque with less than 50% stenosis: Clinically stable, I don't think I would routinely follow this unless he has a CVA.  7. Breast cancer treated with anastrozole.  8. Atypical chest pain, not changed.  9. Chronic anticoagulation, prior GI bleed on NSAIDs, doing well so far, no bleeding  10. Constipation, better     -No medication changes today.  I think a conservative approach is best with the exception of the warfarin.  However if he has recurrence of serious bleeding and I likely would not retrial the warfarin going forward.  -He is on adequate heart failure medications, I think adding back the Lasix was the right thing to do.  -Cont checking weight and call our office if 165.  -If his BP is low at times, hold lisinopril (I wrote down on his paperwork today)  -Consider spirionolactone 12.5 mg daily for low EF  -No plans for ICD, already saw Dr. Matthew  -Not likely candidate for BiV pacing, has RBBB morphology.    -RTC 6 months, BMP at that time and now    Written instructions given today:  -Blood work, non fasting 1 week and 6 months just before you return.  -Call office if your weight reaches 165 pounds.  -If you have a low blood pressure day, hold the lisinopril.  If if persists, call the cardiology office in [place]: [phone].    Return in about 6 months (around 9/12/2019).    It is my pleasure to participate in the care of Mr. Garrett.  Please do not hesitate to contact me with questions or  concerns.    Nikki Azevedo MD, [facility]  Cardiologist [facility]    Please note that this dictation was created using voice recognition software. I have made every reasonable attempt to correct obvious errors, but it is possible there are errors of grammar and possibly content that I did not discover before finalizing the note.

## 2019-03-20 LAB — INR PPP: 2.3 (ref 2–3.5)

## 2019-03-21 ENCOUNTER — ANTICOAGULATION MONITORING (OUTPATIENT)
Dept: VASCULAR LAB | Facility: MEDICAL CENTER | Age: 84
End: 2019-03-21

## 2019-03-21 DIAGNOSIS — Z79.01 CHRONIC ANTICOAGULATION: ICD-10-CM

## 2019-03-21 DIAGNOSIS — I48.91 ATRIAL FIBRILLATION, UNSPECIFIED TYPE (HCC): ICD-10-CM

## 2019-03-21 DIAGNOSIS — I42.0 DILATED CARDIOMYOPATHY (HCC): ICD-10-CM

## 2019-03-21 NOTE — PROGRESS NOTES
Anticoagulation Summary  As of 3/21/2019    INR goal:   2.0-3.0   TTR:   22.6 % (8.9 mo)   INR used for dosin.3 (3/20/2019)   Warfarin maintenance plan:   8 mg (2 mg x 4) every Tue, Thu, Sat; 6 mg (2 mg x 3) all other days   Weekly warfarin total:   48 mg   Plan last modified:   Pawan Chen, PharmD (2019)   Next INR check:   2019   Target end date:   Indefinite    Indications    Atrial fibrillation (HCC) [I48.91]  Chronic anticoagulation [Z79.01]             Anticoagulation Episode Summary     INR check location:       Preferred lab:       Send INR reminders to:       Comments:         Anticoagulation Care Providers     Provider Role Specialty Phone number    [facility] Responsible  [phone]    Jerad Collins Responsible          Anticoagulation Patient Findings    Left voicemail message to report a   therapeutic INR of 2.3.  Pt to continue with current warfarin dosing regimen. Requested pt contact the clinic for any s/s of unusual bleeding, bruising, clotting or any changes to diet or medication.  Follow up in 4 weeks, to reduce the risk of adverse events related to this high risk medication, warfarin.    Leatha Mancini, Clinical Pharmacist

## 2019-04-19 ENCOUNTER — ANTICOAGULATION MONITORING (OUTPATIENT)
Dept: VASCULAR LAB | Facility: MEDICAL CENTER | Age: 84
End: 2019-04-19

## 2019-04-19 DIAGNOSIS — I48.91 ATRIAL FIBRILLATION, UNSPECIFIED TYPE (HCC): ICD-10-CM

## 2019-04-19 DIAGNOSIS — Z79.01 CHRONIC ANTICOAGULATION: ICD-10-CM

## 2019-04-19 LAB — INR PPP: 2.2 (ref 2–3.5)

## 2019-04-19 NOTE — PROGRESS NOTES
Anticoagulation Summary  As of 2019    INR goal:   2.0-3.0   TTR:   30.4 % (9.9 mo)   INR used for dosin.2 (2019)   Warfarin maintenance plan:   8 mg (2 mg x 4) every Tue, Thu, Sat; 6 mg (2 mg x 3) all other days   Weekly warfarin total:   48 mg   Plan last modified:   Pawan Chen, PharmD (2019)   Next INR check:   2019   Target end date:   Indefinite    Indications    Atrial fibrillation (HCC) [I48.91]  Chronic anticoagulation [Z79.01]             Anticoagulation Episode Summary     INR check location:       Preferred lab:       Send INR reminders to:       Comments:         Anticoagulation Care Providers     Provider Role Specialty Phone number    [facility] Responsible  [phone]    Jerad Collins Responsible          Anticoagulation Patient Findings  Patient Findings     Negatives:   Signs/symptoms of thrombosis, Signs/symptoms of bleeding, Laboratory test error suspected, Change in health, Change in alcohol use, Change in activity, Upcoming invasive procedure, Emergency department visit, Upcoming dental procedure, Missed doses, Extra doses, Change in medications, Change in diet/appetite, Hospital admission, Bruising, Other complaints        Spoke with patient today regarding therapeutic INR of 2.2.  Patient denies any signs/symptoms of bruising or bleeding or any changes in diet and medications.  Instructed patient to call clinic with any questions or concerns.  Pt is to continue with current warfarin dosing regimen.  Follow up in 6 weeks, to reduce risk of adverse events related to this high risk medication,  Warfarin.    Herbie Rebollar, PharmD

## 2019-05-30 ENCOUNTER — ANTICOAGULATION MONITORING (OUTPATIENT)
Dept: MEDICAL GROUP | Facility: MEDICAL CENTER | Age: 84
End: 2019-05-30

## 2019-05-30 DIAGNOSIS — Z79.01 CHRONIC ANTICOAGULATION: ICD-10-CM

## 2019-05-30 DIAGNOSIS — I48.91 ATRIAL FIBRILLATION, UNSPECIFIED TYPE (HCC): ICD-10-CM

## 2019-05-30 LAB — INR PPP: 2.4 (ref 2–3.5)

## 2019-05-30 NOTE — PROGRESS NOTES
OP Anticoagulation Service Note    Date: 2019  Anticoagulation Summary  As of 2019    INR goal:   2.0-3.0   TTR:   38.8 % (11.3 mo)   INR used for dosin.40 (2019)   Warfarin maintenance plan:   8 mg (2 mg x 4) every Tue, Thu, Sat; 6 mg (2 mg x 3) all other days   Weekly warfarin total:   48 mg   No change documented:   Castillo Carmona Ass't   Plan last modified:   Char GonzalezD (2019)   Next INR check:   2019   Target end date:   Indefinite    Indications    Atrial fibrillation (HCC) [I48.91]  Chronic anticoagulation [Z79.01]             Anticoagulation Episode Summary     INR check location:       Preferred lab:       Send INR reminders to:       Comments:         Anticoagulation Care Providers     Provider Role Specialty Phone number    [facility] Responsible  [phone]    Jerad Collins Responsible          Anticoagulation Patient Findings  Patient Findings     Negatives:   Signs/symptoms of thrombosis, Signs/symptoms of bleeding, Laboratory test error suspected, Change in health, Change in alcohol use, Change in activity, Upcoming invasive procedure, Emergency department visit, Upcoming dental procedure, Missed doses, Extra doses, Change in medications, Change in diet/appetite, Hospital admission, Bruising, Other complaints        Plan: Spoke to patient. Patient is therapeutic and will remain on the same dose. Patient reports no unusual bleeding or bruising and no changes to medication or diet. Patient is to be checked again in 6 weeks.    Castillo Carmona. Ass't  [place] [facility]    I have reviewed and concur with the above plan     Ramon Queen, CharD

## 2019-06-03 RX ORDER — WARFARIN SODIUM 4 MG/1
TABLET ORAL
Qty: 180 TAB | Refills: 1 | Status: SHIPPED | OUTPATIENT
Start: 2019-06-03 | End: 2019-10-03

## 2019-07-12 ENCOUNTER — ANTICOAGULATION MONITORING (OUTPATIENT)
Dept: VASCULAR LAB | Facility: MEDICAL CENTER | Age: 84
End: 2019-07-12

## 2019-07-12 DIAGNOSIS — I48.91 ATRIAL FIBRILLATION, UNSPECIFIED TYPE (HCC): ICD-10-CM

## 2019-07-12 DIAGNOSIS — Z79.01 CHRONIC ANTICOAGULATION: ICD-10-CM

## 2019-07-12 LAB — INR PPP: 3 (ref 2–3.5)

## 2019-07-12 NOTE — PROGRESS NOTES
Anticoagulation Summary  As of 7/12/2019    INR goal:   2.0-3.0   TTR:   45.7 % (1 y)   INR used for dosing:   3.00 (7/12/2019)   Warfarin maintenance plan:   8 mg (2 mg x 4) every Tue, Thu, Sat; 6 mg (2 mg x 3) all other days   Weekly warfarin total:   48 mg   No change documented:   Castillo Charlton Ass't   Plan last modified:   Pawan Chen, PharmD (2/14/2019)   Next INR check:   8/9/2019   Target end date:   Indefinite    Indications    Atrial fibrillation (HCC) [I48.91]  Chronic anticoagulation [Z79.01]             Anticoagulation Episode Summary     INR check location:       Preferred lab:       Send INR reminders to:       Comments:         Anticoagulation Care Providers     Provider Role Specialty Phone number    [facility] Responsible  [phone]    Jerad Collins Responsible          Anticoagulation Patient Findings  Patient Findings     Negatives:   Signs/symptoms of thrombosis, Signs/symptoms of bleeding, Laboratory test error suspected, Change in health, Change in alcohol use, Change in activity, Upcoming invasive procedure, Emergency department visit, Upcoming dental procedure, Missed doses, Extra doses, Change in medications, Change in diet/appetite, Hospital admission, Bruising, Other complaints      Spoke with patient to report a therapeutic INR.  Pt instructed to continue with current warfarin dosing regimen. Pt denies any s/s of bleeding, bruising, clotting or any changes to diet or medication.  Will follow up in 4 weeks.  Castillo Charlton Ass't    I have reviewed and am in agreement with the above stated plan on 7-12-19.  Herbie Rebollar, PharmD, BCACP

## 2019-08-08 LAB — INR PPP: 3 (ref 2–3.5)

## 2019-08-09 ENCOUNTER — ANTICOAGULATION MONITORING (OUTPATIENT)
Dept: VASCULAR LAB | Facility: MEDICAL CENTER | Age: 84
End: 2019-08-09

## 2019-08-09 DIAGNOSIS — Z79.01 CHRONIC ANTICOAGULATION: ICD-10-CM

## 2019-08-09 DIAGNOSIS — I48.91 ATRIAL FIBRILLATION, UNSPECIFIED TYPE (HCC): ICD-10-CM

## 2019-08-09 NOTE — PROGRESS NOTES
Anticoagulation Summary  As of 8/9/2019    INR goal:   2.0-3.0   TTR:   49.3 % (1.1 y)   INR used for dosing:   3.00 (8/8/2019)   Warfarin maintenance plan:   8 mg (2 mg x 4) every Tue, Thu, Sat; 6 mg (2 mg x 3) all other days   Weekly warfarin total:   48 mg   No change documented:   Dominguez Damon, Med Ass't   Plan last modified:   Pawan Chen, PharmD (2/14/2019)   Next INR check:   9/5/2019   Target end date:   Indefinite    Indications    Atrial fibrillation (HCC) [I48.91]  Chronic anticoagulation [Z79.01]             Anticoagulation Episode Summary     INR check location:       Preferred lab:       Send INR reminders to:       Comments:         Anticoagulation Care Providers     Provider Role Specialty Phone number    [facility] Responsible  [phone]    Jerad Collins Responsible          Anticoagulation Patient Findings  Patient Findings     Negatives:   Signs/symptoms of thrombosis, Signs/symptoms of bleeding, Laboratory test error suspected, Change in health, Change in alcohol use, Change in activity, Upcoming invasive procedure, Emergency department visit, Upcoming dental procedure, Missed doses, Extra doses, Change in medications, Change in diet/appetite, Hospital admission, Bruising, Other complaints        Left voicemail message to report therapeutic INR of 3.0.  Patient to continue with current warfarin dosing regimen. Requested pt contact the clinic for any change to diet or medication, and to report any signs or symptoms of bleeding, bruising or clotting.  Pt to follow up in 4 weeks.    Dominguez Damon, Med Ass't    I have reviewed and am in agreement with the above stated plan on 8-9-19.  Herbie Rebollar, PharmD, BCACP

## 2019-09-13 ENCOUNTER — ANTICOAGULATION MONITORING (OUTPATIENT)
Dept: VASCULAR LAB | Facility: MEDICAL CENTER | Age: 84
End: 2019-09-13

## 2019-09-13 DIAGNOSIS — Z79.01 CHRONIC ANTICOAGULATION: ICD-10-CM

## 2019-09-13 DIAGNOSIS — I42.0 DILATED CARDIOMYOPATHY (HCC): ICD-10-CM

## 2019-09-13 DIAGNOSIS — I48.91 ATRIAL FIBRILLATION, UNSPECIFIED TYPE (HCC): ICD-10-CM

## 2019-09-13 LAB — INR PPP: 2.8 (ref 2–3.5)

## 2019-09-13 NOTE — PROGRESS NOTES
Anticoagulation Summary  As of 2019    INR goal:   2.0-3.0   TTR:   53.4 % (1.2 y)   INR used for dosin.80 (2019)   Warfarin maintenance plan:   8 mg (2 mg x 4) every Tue, Thu, Sat; 6 mg (2 mg x 3) all other days   Weekly warfarin total:   48 mg   No change documented:   Dominguez Damon, Med Ass't   Plan last modified:   Pawan Chen, PharmD (2019)   Next INR check:   10/11/2019   Target end date:   Indefinite    Indications    Atrial fibrillation (HCC) [I48.91]  Chronic anticoagulation [Z79.01]             Anticoagulation Episode Summary     INR check location:       Preferred lab:       Send INR reminders to:       Comments:         Anticoagulation Care Providers     Provider Role Specialty Phone number    [facility] Responsible  [phone]    Jerad Collins Responsible          Anticoagulation Patient Findings  Patient Findings     Negatives:   Signs/symptoms of thrombosis, Signs/symptoms of bleeding, Laboratory test error suspected, Change in health, Change in alcohol use, Change in activity, Upcoming invasive procedure, Emergency department visit, Upcoming dental procedure, Missed doses, Extra doses, Change in medications, Change in diet/appetite, Hospital admission, Bruising, Other complaints        Left voicemail message to report therapeutic INR of 2.8.  Patient to continue with current warfarin dosing regimen. Requested pt contact the clinic for any change to diet or medication, and to report any signs or symptoms of bleeding, bruising or clotting.  Pt to follow up in 4 weeks.    Dominguez Damon, Med Ass't      I have reviewed and concur with the above plan on 2019.  Leatha Mancini, Clinical Pharmacist, CDE, CACP

## 2019-10-03 ENCOUNTER — OFFICE VISIT (OUTPATIENT)
Dept: CARDIOLOGY | Facility: PHYSICIAN GROUP | Age: 84
End: 2019-10-03
Payer: MEDICARE

## 2019-10-03 VITALS
SYSTOLIC BLOOD PRESSURE: 124 MMHG | HEART RATE: 64 BPM | DIASTOLIC BLOOD PRESSURE: 70 MMHG | OXYGEN SATURATION: 98 % | HEIGHT: 76 IN | BODY MASS INDEX: 20.58 KG/M2 | WEIGHT: 169 LBS

## 2019-10-03 DIAGNOSIS — Z79.01 CHRONIC ANTICOAGULATION: ICD-10-CM

## 2019-10-03 DIAGNOSIS — I42.9 CARDIOMYOPATHY, UNSPECIFIED TYPE (HCC): ICD-10-CM

## 2019-10-03 DIAGNOSIS — E78.5 DYSLIPIDEMIA: ICD-10-CM

## 2019-10-03 DIAGNOSIS — I10 ESSENTIAL HYPERTENSION, BENIGN: ICD-10-CM

## 2019-10-03 DIAGNOSIS — I48.91 ATRIAL FIBRILLATION, UNSPECIFIED TYPE (HCC): ICD-10-CM

## 2019-10-03 DIAGNOSIS — I42.0 DILATED CARDIOMYOPATHY (HCC): ICD-10-CM

## 2019-10-03 DIAGNOSIS — F32.A DEPRESSION, UNSPECIFIED DEPRESSION TYPE: ICD-10-CM

## 2019-10-03 PROCEDURE — 99214 OFFICE O/P EST MOD 30 MIN: CPT | Performed by: NURSE PRACTITIONER

## 2019-10-03 RX ORDER — FUROSEMIDE 40 MG/1
20 TABLET ORAL DAILY
COMMUNITY
End: 2020-09-03

## 2019-10-03 RX ORDER — RANITIDINE 150 MG/1
150 TABLET ORAL 2 TIMES DAILY
COMMUNITY
End: 2020-09-03

## 2019-10-03 RX ORDER — METOPROLOL SUCCINATE 25 MG/1
12.5 TABLET, EXTENDED RELEASE ORAL DAILY
Qty: 45 TAB | Refills: 3
Start: 2019-10-03 | End: 2020-04-23

## 2019-10-03 RX ORDER — FLUOXETINE HYDROCHLORIDE 20 MG/1
20 CAPSULE ORAL DAILY
Qty: 30 CAP | Refills: 6
Start: 2019-10-03

## 2019-10-03 RX ORDER — ALBUTEROL SULFATE 4 MG/1
4 TABLET ORAL DAILY
COMMUNITY

## 2019-10-03 RX ORDER — KETOROLAC TROMETHAMINE 10 MG/1
10 TABLET, FILM COATED ORAL
Refills: 1 | COMMUNITY
Start: 2019-09-01 | End: 2019-10-03

## 2019-10-03 ASSESSMENT — ENCOUNTER SYMPTOMS
ORTHOPNEA: 0
PALPITATIONS: 0
COUGH: 0
HEADACHES: 0
INSOMNIA: 0
CHILLS: 0
MYALGIAS: 0
NAUSEA: 0
LOSS OF CONSCIOUSNESS: 0
DIZZINESS: 0
PND: 0
BRUISES/BLEEDS EASILY: 0
ABDOMINAL PAIN: 0
FEVER: 0
SHORTNESS OF BREATH: 0

## 2019-10-03 NOTE — PROGRESS NOTES
Chief Complaint   Patient presents with   • Cardiomyopathy (Non-ischemic)       Subjective:   Isaias Garrett is a 89 y.o. male who presents today for six month follow-up for dilated cardiomyopathy, atrial fibrillation, anticoagulation, hypertension and hyperlipidemia.    Isaias is a 89 year old male with history of dilated cardiomyopathy with LVEF 45%, atrial fibrillation, anticoagulation, hypertension, and hyperlipidemia, previously seen by Lorelei Cook and Tiff.    Since the last visit six month ago, he has been stable. No chset pain, pressure or discomfort; no palpitations; no shortness of breath, orthopnea or PND; no dizziness or syncope; no LE edema. BP has been stable on lower Metoprolol 12.5mg once daily. No bleeding problems. He was seen in ER at [facility] on June 2019 for some abdominal pain; MPI was negative, along with cardiac enzymes. He was determined to have an ulcer. No further problems. He was recently diagnosed with an UTI, treated with antibiotics by urology.    Past Medical History:   Diagnosis Date   • Arthritis    • Asthma    • Atrial fibrillation (HCC)         • Benign essential hypertension 11/23/2009   • Bowel habit changes     Constipation   • Breath shortness    • Cancer (HCC) ?2014    Bladder and breast.   • Cataract    • Chest tightness or pressure 11/23/2009   • Dental disorder     Lower dentures.   • depression    • Dilated cardiomyopathy (HCC) 05/2018    Echocardiogram with normal LV size, mild concentric LVH, LVEF 45%. Mild MR, mild AR, mild TR.   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   • PAC (premature atrial contraction) 11/23/2009   • Pain     Back and hip.   • Palpitations 11/23/2009   • Pneumonia     Around 2010.   • prostate     TURP     Past Surgical History:   Procedure Laterality Date   • LARA BY LAPAROSCOPY  8/8/2010    Performed by IMMANUEL GARCIA at SURGERY [facility] ORS   • UMBILICAL HERNIA  REPAIR  2010    Performed by IMMANUEL GARCIA at SURGERY [facility] ORS   • OTHER      Bladder tumor removed   • OTHER  ?2012    Left breast cancer (no nipple).     No family history on file.  Social History     Socioeconomic History   • Marital status: Single     Spouse name: Not on file   • Number of children: Not on file   • Years of education: Not on file   • Highest education level: Not on file   Occupational History   • Not on file   Social Needs   • Financial resource strain: Not on file   • Food insecurity:     Worry: Not on file     Inability: Not on file   • Transportation needs:     Medical: Not on file     Non-medical: Not on file   Tobacco Use   • Smoking status: Former Smoker     Packs/day: 2.00     Years: 30.00     Pack years: 60.00     Last attempt to quit: 1998     Years since quittin.3   • Smokeless tobacco: Never Used   • Tobacco comment: Quit 10years ago   Substance and Sexual Activity   • Alcohol use: Yes     Comment: One glass a wine daily. 1-2/daily and sometimes whiskey and/or beer.   • Drug use: No   • Sexual activity: Not on file   Lifestyle   • Physical activity:     Days per week: Not on file     Minutes per session: Not on file   • Stress: Not on file   Relationships   • Social connections:     Talks on phone: Not on file     Gets together: Not on file     Attends [employer] service: Not on file     Active member of club or organization: Not on file     Attends meetings of clubs or organizations: Not on file     Relationship status: Not on file   • Intimate partner violence:     Fear of current or ex partner: Not on file     Emotionally abused: Not on file     Physically abused: Not on file     Forced sexual activity: Not on file   Other Topics Concern   • Not on file   Social History Narrative   • Not on file     Allergies   Allergen Reactions   • Antihistamine Decongestant [Dexbrompheniramine-Pseudoeph] Palpitations     Outpatient Encounter Medications as of 10/3/2019    Medication Sig Dispense Refill   • albuterol (PROVENTIL) 4 MG Tab Take 4 mg by mouth 3 times a day.     • furosemide (LASIX) 40 MG Tab Take 40 mg by mouth every day.     • Calcium Carbonate (CALCIUM 600) 1500 (600 Ca) MG Tab Take  by mouth.     • raNITidine (ZANTAC) 150 MG Tab Take 150 mg by mouth 2 times a day.     • Home Care Oxygen Inhale  by mouth Continuous.     • metoprolol SR (TOPROL XL) 25 MG TABLET SR 24 HR Take 0.5 Tabs by mouth every day. 45 Tab 3   • FLUoxetine (PROZAC) 20 MG Cap Take 1 Cap by mouth every day. 30 Cap 6   • lisinopril (PRINIVIL) 10 MG Tab Take 1 Tab by mouth every day. 90 Tab 2   • warfarin (COUMADIN) 2 MG Tab Take 2-3 Tabs by mouth every day. (Patient taking differently: Take 6-8 mg by mouth every day.) 90 Tab 3   • CALCIUM PO Take  by mouth.     • anastrozole (ARIMIDEX) 1 MG TABS Take 1 mg by mouth every day.     • Ferrous Sulfate (IRON) 325 (65 FE) MG TABS Take  by mouth.     • magnesium chloride SR (SLOW-MAG) 535 (64 MG) MG TBCR Take 535 mg by mouth 2 Times a Day.       • [DISCONTINUED] ketorolac (TORADOL) 10 MG Tab Take 10 mg by mouth.  1   • [DISCONTINUED] warfarin (COUMADIN) 4 MG Tab Take 2 tablets (8mg) on Tues, Thurs and Sat; and 1.5 tablets (6mg) all other days OR as directed by the coumadin clinic (Patient not taking: Reported on 10/3/2019) 180 Tab 1   • [DISCONTINUED] metoprolol SR (TOPROL XL) 25 MG TABLET SR 24 HR Take 2 Tabs by mouth every day. 180 Tab 2   • [DISCONTINUED] aspirin 81 MG tablet Take 81 mg by mouth every day.     • [DISCONTINUED] Sennosides (SENNA) 8.6 MG Tab Take 1 Tab by mouth every bedtime.     • [DISCONTINUED] omeprazole (PRILOSEC) 40 MG capsule Take 40 mg by mouth every day.     • [DISCONTINUED] fluoxetine (PROZAC) 20 MG CAPS Take 20 mg by mouth every Monday, Wednesday, and Friday.       No facility-administered encounter medications on file as of 10/3/2019.      Review of Systems   Constitutional: Negative for chills and fever.   HENT: Negative for  "congestion.    Respiratory: Negative for cough and shortness of breath.    Cardiovascular: Negative for chest pain, palpitations, orthopnea, leg swelling and PND.   Gastrointestinal: Negative for abdominal pain and nausea.   Musculoskeletal: Negative for myalgias.   Skin: Negative for rash.   Neurological: Negative for dizziness, loss of consciousness and headaches.   Endo/Heme/Allergies: Does not bruise/bleed easily.   Psychiatric/Behavioral: The patient does not have insomnia.         Objective:   /70 (BP Location: Right arm, Patient Position: Sitting, BP Cuff Size: Adult)   Pulse 64   Ht 1.918 m (6' 3.5\")   Wt 76.7 kg (169 lb)   SpO2 98%   BMI 20.84 kg/m²     Physical Exam   Constitutional: He is oriented to person, place, and time. He appears well-developed and well-nourished.   Tall, thin   HENT:   Head: Normocephalic.   Eyes: EOM are normal.   Neck: Normal range of motion. Neck supple. No JVD present.   Cardiovascular: Normal rate. An irregularly irregular rhythm present.   Murmur heard.   Systolic murmur is present with a grade of 2/6.  Murmur LLSB   Pulmonary/Chest: Effort normal and breath sounds normal. No respiratory distress. He has no wheezes. He has no rales.   Abdominal: Soft. Bowel sounds are normal. He exhibits no distension. There is no tenderness.   Musculoskeletal: Normal range of motion. He exhibits no edema.   Neurological: He is alert and oriented to person, place, and time.   Skin: Skin is warm and dry. No rash noted.   Psychiatric: He has a normal mood and affect.     MPI of 6/20/2019:  MPI negative for ischemia or infarct.    Echocardiogram of May 2018:  Normal LV size, mild concentric LVH, LVEF 45%. Mild MR, mild AR, mild TR.    LABS AS OF 9/23/2019:  Glucose 80  BUN 26  Creatinine 1.56  GFR 38  Potassium 3.9        Assessment:     1. Cardiomyopathy, unspecified type (HCC)  metoprolol SR (TOPROL XL) 25 MG TABLET SR 24 HR    EC-ECHOCARDIOGRAM COMPLETE W/O CONT   2. Dilated " cardiomyopathy (HCC)     3. Atrial fibrillation, unspecified type (HCC)  metoprolol SR (TOPROL XL) 25 MG TABLET SR 24 HR    EC-ECHOCARDIOGRAM COMPLETE W/O CONT   4. Chronic anticoagulation  metoprolol SR (TOPROL XL) 25 MG TABLET SR 24 HR   5. Essential hypertension, benign     6. Dyslipidemia     7. Depression, unspecified depression type  FLUoxetine (PROZAC) 20 MG Cap       Medical Decision Making:  Today's Assessment / Status / Plan:     1. Dilated cardiomyopathy with LVEF 45%, with no overt HF symptoms. To repeat echocardiogram in 6 months. To continue BB, ACEI and Coumadin.    2. Atrial fibrillation, rate controlled.    3. Anticoagulation with Coumadin. He is compliant with INRs.    4. Hypertension, treated and stable. BP is good today.    5. Hyperlipidemia, not currently on any therapy.    6. Depression, treated with Prozac.    7. Recent UTI, treated by urology.    He remains stable at this time. Same medications, and follow-up in 6 months, with echo the week before.    Collaborating MD: Jadon

## 2019-11-27 ENCOUNTER — TELEPHONE (OUTPATIENT)
Dept: VASCULAR LAB | Facility: MEDICAL CENTER | Age: 84
End: 2019-11-27

## 2019-11-27 DIAGNOSIS — Z79.01 CHRONIC ANTICOAGULATION: ICD-10-CM

## 2019-11-27 NOTE — TELEPHONE ENCOUNTER
Pt is over due for PT/INR for warfarin monitoring. Called to remind patient to get PT/INR lab done.  Spoke with pt on the phone. Pt is in a SNF, has been for about 2 months. Pt reports he is going home next week advised to get INR checked a few days after discharge.     Susy Schroeder, Pharm D

## 2019-12-04 ENCOUNTER — ANTICOAGULATION MONITORING (OUTPATIENT)
Dept: VASCULAR LAB | Facility: MEDICAL CENTER | Age: 84
End: 2019-12-04

## 2019-12-04 DIAGNOSIS — I48.91 ATRIAL FIBRILLATION, UNSPECIFIED TYPE (HCC): ICD-10-CM

## 2019-12-04 DIAGNOSIS — Z79.01 CHRONIC ANTICOAGULATION: ICD-10-CM

## 2019-12-04 LAB — INR PPP: 1.7 (ref 2–3.5)

## 2019-12-05 NOTE — PROGRESS NOTES
Anticoagulation Summary  As of 2019    INR goal:   2.0-3.0   TTR:   56.4 % (1.4 y)   INR used for dosin.70! (2019)   Warfarin maintenance plan:   8 mg (2 mg x 4) every Tue, Thu, Sat; 6 mg (2 mg x 3) all other days   Weekly warfarin total:   48 mg   Plan last modified:   Leatha Mancini (2019)   Next INR check:   2019   Target end date:   Indefinite    Indications    Atrial fibrillation (HCC) [I48.91]  Chronic anticoagulation [Z79.01]             Anticoagulation Episode Summary     INR check location:       Preferred lab:       Send INR reminders to:       Comments:         Anticoagulation Care Providers     Provider Role Specialty Phone number    [facility] Responsible  [phone]    Jerad Collins Responsible          Anticoagulation Patient Findings          Left voicemail message to report a sub therapeutic INR of 1.7.  Pt to BOLUS DOSE WITH 8MG TONIGHT THEN  continue with current warfarin dosing regimen. Requested pt contact the clinic for any s/s of unusual bleeding, bruising, clotting or any changes to diet or medication.  Follow up in 2 weeks, to reduce the risk of adverse events related to this high risk medication, warfarin.    Leatha Mancini, Clinical Pharmacist

## 2020-01-20 ENCOUNTER — TELEPHONE (OUTPATIENT)
Dept: VASCULAR LAB | Facility: MEDICAL CENTER | Age: 85
End: 2020-01-20

## 2020-01-20 NOTE — TELEPHONE ENCOUNTER
Spoke with pt, currently admitted at [facility] [place]. States that he was taken off warfarin by them, will request records.       Ingrid Mariscal, CharD

## 2020-01-27 ENCOUNTER — TELEPHONE (OUTPATIENT)
Dept: VASCULAR LAB | Facility: MEDICAL CENTER | Age: 85
End: 2020-01-27

## 2020-01-28 ENCOUNTER — TELEPHONE (OUTPATIENT)
Dept: VASCULAR LAB | Facility: MEDICAL CENTER | Age: 85
End: 2020-01-28

## 2020-01-28 NOTE — TELEPHONE ENCOUNTER
Spoke with [facility], they are not sure what is going on with the patients warfarin, they do not see anything in the patients chart regarding the warfarin being d/c'd. Patient discharged from their facility on 12/24/19.

## 2020-01-28 NOTE — TELEPHONE ENCOUNTER
Spoke with Dr. Marin's office.     Pt was stopped on warfarin 12/17 by Dr. Mann for hernia surgery.   Pt was to resume warfarin at the [facility].   Unclear if [facility] MD then D/C'ed warfarin?   Per current D/C summary pt is NOT on warfarin.   Pt has appt with PCP, Dr Marin in March.     Will ask for f/u with [employer] for notes. [phone]    Ingrid Mariscal, PharmD

## 2020-01-29 ENCOUNTER — TELEPHONE (OUTPATIENT)
Dept: VASCULAR LAB | Facility: MEDICAL CENTER | Age: 85
End: 2020-01-29

## 2020-01-29 NOTE — TELEPHONE ENCOUNTER
Called and spoke with pt.  He is again admitted at [facility] due to GI issues.   States that he was taken off warfarin by the operating surgeon who did his hernia operation. States that he did that incase they needed to do further operations. Denies that he was having any bleeding issues.     Appears pt was/is on warfarin due to Afib with CHADSVASC = 4 (HTN, age, CAD)    At this point I am unclear when/if pt should resume. Will send to PCP, DALILA Ba, and Dr. Bloch for recommendations.     Ingrid Mariscal, PharmD

## 2020-01-29 NOTE — TELEPHONE ENCOUNTER
Per note of Dr. Azevedo in March 2019, he should be on Coumadin.  IF he has any bleeding issues, he should NOT continue it, but sounds like he hasn't had any bleeding issues. So I would try to resume it.   Thanks, AB

## 2020-02-12 ENCOUNTER — TELEPHONE (OUTPATIENT)
Dept: VASCULAR LAB | Facility: MEDICAL CENTER | Age: 85
End: 2020-02-12

## 2020-02-13 NOTE — TELEPHONE ENCOUNTER
"Will have records scanned. 42 pages from 1/28/20 admission for acute pancreatitis.   The only mention of anticoagulation I found was on page 35, in the plan section  \"Chronic Afib:  -Continue metoprolol succinate ER 25mg Qday no anticoagulation\"    Will send to Dr. Bloch to review.     Ingrid Mariscal, PharmD    "

## 2020-02-13 NOTE — TELEPHONE ENCOUNTER
Please request records from [facility] from recent admissions.     Ingrid Mariscal, PharmD

## 2020-02-19 ENCOUNTER — TELEPHONE (OUTPATIENT)
Dept: CARDIOLOGY | Facility: MEDICAL CENTER | Age: 85
End: 2020-02-19

## 2020-02-19 NOTE — TELEPHONE ENCOUNTER
FW: anticoag on hold   Received: Yesterday   Message Contents   KEERTHI Santana R.N.             Can you please get this patient in with me sooner in [place] (than April) to discuss resuming anticoagulation?     Thanks!   Chery    Previous Messages      ----- Message -----   From: Michael J Bloch, M.D.   Sent: 2/13/2020   2:33 PM PST   To: Ingrid Mariscal, PharmD, DIAMOND Santana.BHAVNA, *   Subject: anticoag on hold                                 Chery-     Sounds like anticoag currently on hold and a lot of uncertainty about whether or not it should be restarted.     Given the uncertainty should we just leave him off until he comes back to see you in april? Do you think we should move up his appt?     Bloch           Task to scheduling - per AB please schedule sooner with AB in [place]

## 2020-04-07 ENCOUNTER — TELEPHONE (OUTPATIENT)
Dept: CARDIOLOGY | Facility: MEDICAL CENTER | Age: 85
End: 2020-04-07

## 2020-04-07 DIAGNOSIS — I42.9 CARDIOMYOPATHY, UNSPECIFIED TYPE (HCC): ICD-10-CM

## 2020-04-07 DIAGNOSIS — I48.91 ATRIAL FIBRILLATION, UNSPECIFIED TYPE (HCC): ICD-10-CM

## 2020-04-07 NOTE — TELEPHONE ENCOUNTER
Called pt. To give test results.   Unable to schedule pt. For FV with Chery in [place] office in June-July. To schedulers to call pt. To schedule.

## 2020-04-07 NOTE — TELEPHONE ENCOUNTER
----- Message from KEERTHI Santana sent at 4/7/2020 10:15 AM PDT -----  Echo is basically stable since 2018. No change in treatment.  He was scheduled to be seen on 4/16/2020 - can you push it out to June or July 2020 in [place]?  Thanks, AB

## 2020-04-16 ENCOUNTER — ANTICOAGULATION MONITORING (OUTPATIENT)
Dept: VASCULAR LAB | Facility: MEDICAL CENTER | Age: 85
End: 2020-04-16

## 2020-04-16 ENCOUNTER — TELEPHONE (OUTPATIENT)
Dept: VASCULAR LAB | Facility: MEDICAL CENTER | Age: 85
End: 2020-04-16

## 2020-04-16 DIAGNOSIS — Z79.01 CHRONIC ANTICOAGULATION: ICD-10-CM

## 2020-04-16 DIAGNOSIS — I48.91 ATRIAL FIBRILLATION, UNSPECIFIED TYPE (HCC): ICD-10-CM

## 2020-04-16 NOTE — PROGRESS NOTES
Per VM from pt, he is no longer on anticoagulation, will close episode.     Ingrid Mariscal, PharmD

## 2020-04-16 NOTE — LETTER
April 16, 2020          Tej Garrett  [address]  [place] [facility] [address]      Dear Tej Garrett ,    We have been unsuccessful in our attempts to contact you regarding your Anticoagulation Service appointments. Warfarin is a potent blood-thinning agent that requires monitoring to ensure that the dosage is correct for your body.  If it isn't, you could develop serious, sometimes life-threatening bleeding problems or life-threatening blood clots or stroke could result.     It is extremely important that you have your lab work drawn as soon as possible.  We are open Monday-Friday 8 am until 5 pm.  You may reach our Service at [phone].     To monitor you effectively, we need to be able to communicate with you.  This is a requirement to be followed by our Service.  If we are unable to contact you on three separate occasions, you will be discharged from the Anticoagulation Service.     If you repeatedly fail to keep your lab appointments, you are at risk of being discharged from the Service.           Sincerely,           Jerad Collins, PharmD, [facility]  Pharmacy Clinical Supervisor  [facility]  Outpatient Anticoagulation Service

## 2020-04-16 NOTE — TELEPHONE ENCOUNTER
Left message for pt to have INR checked  Sent pt my chart message as well  Requested call back to determine disposition of anticoagulation   Leatha Mancini, Clinical Pharmacist, CDE, CACP

## 2020-04-17 ENCOUNTER — ANTICOAGULATION MONITORING (OUTPATIENT)
Dept: VASCULAR LAB | Facility: MEDICAL CENTER | Age: 85
End: 2020-04-17

## 2020-04-17 NOTE — PROGRESS NOTES
Discharged from [facility].    Leatha Mancini, Clinical Pharmacist, CDE, CACP

## 2020-04-20 ENCOUNTER — TELEPHONE (OUTPATIENT)
Dept: CARDIOLOGY | Facility: MEDICAL CENTER | Age: 85
End: 2020-04-20

## 2020-04-20 ENCOUNTER — PATIENT MESSAGE (OUTPATIENT)
Dept: CARDIOLOGY | Facility: MEDICAL CENTER | Age: 85
End: 2020-04-20

## 2020-04-20 NOTE — TELEPHONE ENCOUNTER
----- Message from KEERTHI Santana sent at 4/20/2020 11:44 AM PDT -----  Regarding: FW: cxl: 4-16 cards f/u: anticoag on hold  Can you please call this patient and see if he is taking coumadin (or not)?    I know that it was stopped in December after surgery, but am not sure if it was restarted (and stopped again) or not.    Can you please confirm?  Would he (and his family) be willing to do a Virtual visit to discuss?    Thanks, AB  ----- Message -----  From: Michael J Bloch, M.D.  Sent: 4/16/2020   2:53 PM PDT  To: KEERTHI Santana, Michael J Bloch, M.D.  Subject: cxl: 4-16 cards f/u: anticoag on hold            Chery -looks like patient canceled appointment with you for today and did not reschedule until September.  Seems like the issues with her anticoagulation probably should be addressed before that.  Not sure if you can fit her in for a virtual visit before then.  Of course, if you think she should to stay off anticoagulation until September were okay with that as well -just 1 to make sure she does not fall through the cracks  Just let us know how you would like to proceed    Thanks  ----- Message -----  From: Michael J Bloch, M.D.  Sent: 2/20/2020   8:34 AM PDT  To: Michael J Bloch, M.D.  Subject: 4-16 cards f/u: anticoag on hold                   ----- Message -----  From: KEERTHI Santana  Sent: 2/18/2020  10:19 AM PST  To: Michael J Bloch, M.D.  Subject: RE: anticoag on hold                             I have asked my nurse to get patient in sooner to discuss anticoagulation. Thanks!  ----- Message -----  From: Michael J Bloch, M.D.  Sent: 2/13/2020   2:33 PM PST  To: Ingrid Mariscal, PharmD, KEERTHI Santana, #  Subject: anticoag on hold                                 Chery-    Sounds like anticoag currently on hold and a lot of uncertainty about whether or not it should be restarted.     Given the uncertainty should we just leave him off until he comes back to see you in april? Do you think we should  move up his appt?    Bloch

## 2020-04-20 NOTE — TELEPHONE ENCOUNTER
Called pt. He has not taken Coumadin since his admission to [facility]. His gastroenterologist is Dr. Lynn and PCP is Dr. Marin.   Scheduled virtual appointment for pt. On 4-22-20 with Chery. Instructions emailed to pt.

## 2020-04-23 ENCOUNTER — TELEMEDICINE (OUTPATIENT)
Dept: CARDIOLOGY | Facility: MEDICAL CENTER | Age: 85
End: 2020-04-23
Payer: MEDICARE

## 2020-04-23 VITALS
WEIGHT: 155 LBS | HEART RATE: 75 BPM | DIASTOLIC BLOOD PRESSURE: 66 MMHG | BODY MASS INDEX: 19.89 KG/M2 | SYSTOLIC BLOOD PRESSURE: 116 MMHG | HEIGHT: 74 IN

## 2020-04-23 DIAGNOSIS — Z79.01 CHRONIC ANTICOAGULATION: ICD-10-CM

## 2020-04-23 DIAGNOSIS — I42.9 CARDIOMYOPATHY, UNSPECIFIED TYPE (HCC): ICD-10-CM

## 2020-04-23 DIAGNOSIS — I10 ESSENTIAL HYPERTENSION, BENIGN: ICD-10-CM

## 2020-04-23 DIAGNOSIS — I42.0 DILATED CARDIOMYOPATHY (HCC): ICD-10-CM

## 2020-04-23 DIAGNOSIS — I48.91 ATRIAL FIBRILLATION, UNSPECIFIED TYPE (HCC): ICD-10-CM

## 2020-04-23 DIAGNOSIS — E78.5 DYSLIPIDEMIA: ICD-10-CM

## 2020-04-23 PROCEDURE — 99214 OFFICE O/P EST MOD 30 MIN: CPT | Mod: 95,CR | Performed by: NURSE PRACTITIONER

## 2020-04-23 RX ORDER — LISINOPRIL 2.5 MG/1
2.5 TABLET ORAL DAILY
Qty: 90 TAB | Refills: 3
Start: 2020-04-23

## 2020-04-23 RX ORDER — METOPROLOL SUCCINATE 25 MG/1
25 TABLET, EXTENDED RELEASE ORAL DAILY
Qty: 90 TAB | Refills: 3
Start: 2020-04-23

## 2020-04-23 ASSESSMENT — ENCOUNTER SYMPTOMS
NAUSEA: 0
PALPITATIONS: 0
HEADACHES: 0
FEVER: 0
SHORTNESS OF BREATH: 0
CHILLS: 0
PND: 0
LOSS OF CONSCIOUSNESS: 0
DIZZINESS: 0
ORTHOPNEA: 0
BRUISES/BLEEDS EASILY: 0
ABDOMINAL PAIN: 0
INSOMNIA: 0
MYALGIAS: 0
COUGH: 0

## 2020-04-23 NOTE — PROGRESS NOTES
Chief Complaint   Patient presents with   • Follow-Up   • Cardiomyopathy (Non-ischemic)   • Atrial Fibrillation   • Anticoagulation   • HTN (Controlled)   • Hyperlipidemia     This encounter was conducted via [employer].   Verbal consent was obtained. Patient's identity was verified.    Subjective:   Isaias Garrett is a 89 y.o. male who presents today for for six month follow-up of dilated cardiomyopathy, paroxysmal atrial fibrillation, hypertension and hyperlipidemia.    Isaias is a 89 year old male with history of dilated cardiomyopathy with LVEF 45%, atrial fibrillation, anticoagulation, hypertension, and hyperlipidemia, previously seen by Lorelei Cook and Tiff.    In January/February 2020, he had hernia surgery, complicated by colon surgery, and Coumadin was stopped. It has not been restarted and he understands the risk of stroke.    He is here today for follow-up. Generally, he is doing well: no chset pain, pressure or discomfort; no symptomatic palpitations; no shortness of breath, orthopnea or PND; no dizziness or syncope; no LE edema. Generally, he is doing well.     Past Medical History:   Diagnosis Date   • Arthritis    • Asthma    • Atrial fibrillation (HCC) 01/2020    Coumadin stopped after GI surgery. Patient understands risks of not taking.   • Benign essential hypertension     • Bowel habit changes     Constipation   • Breath shortness    • Cancer (HCC) ?2014    Bladder and breast.   • Cataract    • Dental disorder     Lower dentures.   • Depression    • Dilated cardiomyopathy (HCC) 04/2020    Echocardiogram with normal LV size, LVEF 40%, mild global hypokinesis. Severely dilated LA, mildly increased RA. Mild MR. 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   • PAC (premature atrial contraction) 11/23/2009   • Pain     Back and hip   • Palpitations    • Pneumonia     • prostate     TURP     Past Surgical History:   Procedure Laterality Date   • LARA BY LAPAROSCOPY  2010    Performed by IMMANUEL GARCIA at SURGERY [facility] ORS   • UMBILICAL HERNIA REPAIR  2010    Performed by IMMANUEL GARCIA at SURGERY [facility] ORS   • OTHER      Bladder tumor removed   • OTHER  ?    Left breast cancer (no nipple).     History reviewed. No pertinent family history.  Social History     Socioeconomic History   • Marital status: Single     Spouse name: Not on file   • Number of children: Not on file   • Years of education: Not on file   • Highest education level: Not on file   Occupational History   • Not on file   Social Needs   • Financial resource strain: Not on file   • Food insecurity     Worry: Not on file     Inability: Not on file   • Transportation needs     Medical: Not on file     Non-medical: Not on file   Tobacco Use   • Smoking status: Former Smoker     Packs/day: 2.00     Years: 30.00     Pack years: 60.00     Last attempt to quit: 1998     Years since quittin.9   • Smokeless tobacco: Never Used   • Tobacco comment: Quit 10years ago   Substance and Sexual Activity   • Alcohol use: Yes     Comment: One glass a wine daily. 1-2/daily and sometimes whiskey and/or beer.   • Drug use: No   • Sexual activity: Not on file   Lifestyle   • Physical activity     Days per week: Not on file     Minutes per session: Not on file   • Stress: Not on file   Relationships   • Social connections     Talks on phone: Not on file     Gets together: Not on file     Attends Jainism service: Not on file     Active member of club or organization: Not on file     Attends meetings of clubs or organizations: Not on file     Relationship status: Not on file   • Intimate partner violence     Fear of current or ex partner: Not on file     Emotionally abused: Not on file     Physically abused: Not on file     Forced sexual activity: Not on file   Other Topics Concern   • Not on file    Social History Narrative   • Not on file     Allergies   Allergen Reactions   • Antihistamine Decongestant [Dexbrompheniramine-Pseudoeph] Palpitations     Outpatient Encounter Medications as of 4/23/2020   Medication Sig Dispense Refill   • lisinopril (PRINIVIL) 2.5 MG Tab Take 1 Tab by mouth every day. 90 Tab 3   • metoprolol SR (TOPROL XL) 25 MG TABLET SR 24 HR Take 1 Tab by mouth every day. 90 Tab 3   • albuterol (PROVENTIL) 4 MG Tab Take 4 mg by mouth every day.     • furosemide (LASIX) 40 MG Tab Take 20 mg by mouth every day.     • Home Care Oxygen Inhale  by mouth Continuous.     • FLUoxetine (PROZAC) 20 MG Cap Take 1 Cap by mouth every day. 30 Cap 6   • CALCIUM PO Take  by mouth.     • Ferrous Sulfate (IRON) 325 (65 FE) MG TABS Take  by mouth.     • raNITidine (ZANTAC) 150 MG Tab Take 150 mg by mouth 2 times a day.     • [DISCONTINUED] Calcium Carbonate (CALCIUM 600) 1500 (600 Ca) MG Tab Take  by mouth.     • [DISCONTINUED] metoprolol SR (TOPROL XL) 25 MG TABLET SR 24 HR Take 0.5 Tabs by mouth every day. (Patient taking differently: Take 25 mg by mouth every day.) 45 Tab 3   • [DISCONTINUED] lisinopril (PRINIVIL) 10 MG Tab Take 1 Tab by mouth every day. (Patient taking differently: Take 2.5 mg by mouth every day.) 90 Tab 2   • [DISCONTINUED] warfarin (COUMADIN) 2 MG Tab Take 2-3 Tabs by mouth every day. (Patient not taking: Reported on 4/23/2020) 90 Tab 3   • anastrozole (ARIMIDEX) 1 MG TABS Take 1 mg by mouth every day.     • magnesium chloride SR (SLOW-MAG) 535 (64 MG) MG TBCR Take 535 mg by mouth 2 Times a Day.         No facility-administered encounter medications on file as of 4/23/2020.      Review of Systems   Constitutional: Negative for chills and fever.   HENT: Negative for congestion.    Respiratory: Negative for cough and shortness of breath.    Cardiovascular: Negative for chest pain, palpitations, orthopnea, leg swelling and PND.   Gastrointestinal: Negative for abdominal pain and nausea.  "  Musculoskeletal: Negative for myalgias.   Skin: Negative for rash.   Neurological: Negative for dizziness, loss of consciousness and headaches.   Endo/Heme/Allergies: Does not bruise/bleed easily.   Psychiatric/Behavioral: The patient does not have insomnia.         Objective:   /66 (BP Location: Left arm, Patient Position: Sitting, BP Cuff Size: Adult)   Pulse 75   Ht 1.88 m (6' 2\")   Wt 70.3 kg (155 lb)   BMI 19.90 kg/m²     Physical Exam   Constitutional: He is oriented to person, place, and time. He appears well-developed and well-nourished.   HENT:   Head: Normocephalic.   Eyes: Conjunctivae are normal. Right eye exhibits no discharge.   Neck: Normal range of motion.   Pulmonary/Chest: Effort normal. No respiratory distress.   Neurological: He is alert and oriented to person, place, and time.   Skin: No rash noted. No erythema.   Psychiatric: He has a normal mood and affect. His behavior is normal.     RESULTS OF ECHOCARDIOGRAM OF 4/6/2020:  Normal LV size  Moderate concentric LVH 40%  Severely dilated LA  Mildly increased RA  Mild MR  Trace AR      Assessment:     1. Dilated cardiomyopathy (HCC)     2. Atrial fibrillation, unspecified type (HCC)  metoprolol SR (TOPROL XL) 25 MG TABLET SR 24 HR   3. Chronic anticoagulation  metoprolol SR (TOPROL XL) 25 MG TABLET SR 24 HR   4. Essential hypertension, benign  lisinopril (PRINIVIL) 2.5 MG Tab   5. Dyslipidemia     6. Cardiomyopathy, unspecified type (HCC)  lisinopril (PRINIVIL) 2.5 MG Tab    metoprolol SR (TOPROL XL) 25 MG TABLET SR 24 HR       Medical Decision Making:  Today's Assessment / Status / Plan:     1. History of dilated cardiomyopathy with LVEF 40%, stable from previous. He remains on BB, ACEI and Lasix.    2. Paroxysmal atrial fibrillation, rate controlled with Toprol.    3. Previously anticoagulated with Coumadin, stopped due to GI surgery. We discussed risks today, and he does not want to resume it. He understands and accepts risks.    4. " Hypertension, treated and stable. BP is good today.    5. Hyperlipidemia, not currently on any therapy. To check fasting CMP and lipid panel.    Same medications for now, including no Coumadin. Labs as above. Keep September 2020 FU as scheduled.    FU sooner if clinical condition changes.    Collaborating MD: Katlin

## 2020-05-01 DIAGNOSIS — I10 ESSENTIAL HYPERTENSION, BENIGN: ICD-10-CM

## 2020-05-01 DIAGNOSIS — I48.91 ATRIAL FIBRILLATION, UNSPECIFIED TYPE (HCC): ICD-10-CM

## 2020-05-01 DIAGNOSIS — E78.5 DYSLIPIDEMIA: ICD-10-CM

## 2020-05-04 ENCOUNTER — TELEPHONE (OUTPATIENT)
Dept: CARDIOLOGY | Facility: MEDICAL CENTER | Age: 85
End: 2020-05-04

## 2020-05-04 NOTE — TELEPHONE ENCOUNTER
----- Message from KEERTHI Santana sent at 5/4/2020  9:07 AM PDT -----  Labs are all stable - lipids are good. Renal function is stable.  Same meds for now.  Thanks, AB

## 2020-09-03 ENCOUNTER — OFFICE VISIT (OUTPATIENT)
Dept: CARDIOLOGY | Facility: PHYSICIAN GROUP | Age: 85
End: 2020-09-03
Payer: MEDICARE

## 2020-09-03 VITALS
OXYGEN SATURATION: 98 % | BODY MASS INDEX: 21.15 KG/M2 | DIASTOLIC BLOOD PRESSURE: 50 MMHG | HEART RATE: 68 BPM | WEIGHT: 164.8 LBS | SYSTOLIC BLOOD PRESSURE: 120 MMHG | HEIGHT: 74 IN

## 2020-09-03 DIAGNOSIS — Z85.3 HISTORY OF BREAST CANCER: ICD-10-CM

## 2020-09-03 DIAGNOSIS — I10 ESSENTIAL HYPERTENSION, BENIGN: ICD-10-CM

## 2020-09-03 DIAGNOSIS — I42.0 DILATED CARDIOMYOPATHY (HCC): ICD-10-CM

## 2020-09-03 DIAGNOSIS — Z79.01 CHRONIC ANTICOAGULATION: ICD-10-CM

## 2020-09-03 DIAGNOSIS — I48.91 ATRIAL FIBRILLATION, UNSPECIFIED TYPE (HCC): ICD-10-CM

## 2020-09-03 DIAGNOSIS — E78.5 DYSLIPIDEMIA: ICD-10-CM

## 2020-09-03 DIAGNOSIS — I65.23 BILATERAL CAROTID ARTERY STENOSIS: ICD-10-CM

## 2020-09-03 PROCEDURE — 99214 OFFICE O/P EST MOD 30 MIN: CPT | Performed by: NURSE PRACTITIONER

## 2020-09-03 RX ORDER — PANTOPRAZOLE SODIUM 40 MG/1
TABLET, DELAYED RELEASE ORAL
COMMUNITY
Start: 2020-08-05

## 2020-09-03 RX ORDER — WARFARIN SODIUM 2 MG/1
TABLET ORAL DAILY
COMMUNITY
Start: 2020-07-13

## 2020-09-03 RX ORDER — FUROSEMIDE 20 MG/1
20 TABLET ORAL DAILY
COMMUNITY
Start: 2020-08-05

## 2020-09-03 RX ORDER — WARFARIN SODIUM 4 MG/1
TABLET ORAL
COMMUNITY
Start: 2020-07-13 | End: 2021-03-04

## 2020-09-03 ASSESSMENT — ENCOUNTER SYMPTOMS
HEADACHES: 0
ABDOMINAL PAIN: 0
INSOMNIA: 0
FEVER: 0
LOSS OF CONSCIOUSNESS: 0
BRUISES/BLEEDS EASILY: 0
PALPITATIONS: 1
NAUSEA: 0
DIZZINESS: 0
MYALGIAS: 0
PND: 0
ORTHOPNEA: 0
COUGH: 0
CHILLS: 0
SHORTNESS OF BREATH: 0

## 2020-09-03 NOTE — PROGRESS NOTES
Chief Complaint   Patient presents with   • Follow-Up   • Cardiomyopathy (Non-ischemic)   • Atrial Fibrillation   • Anticoagulation       Subjective:   Isaias Garrett is a 89 y.o. male who presents today for four month follow-up of dilated cardiomyopathy, atrial fibrillation, anticoagulation, hypertension and hyperilpidemia.    Isaias is a 89 year old male with history of dilated cardiomyopathy with LVEF 45%, atrial fibrillation, anticoagulation, hypertension, and hyperlipidemia, previously seen by Lorelei Cook and Tiff, and last seen by me in April 2020 via TeleMedicine.     In January/February 2020, he had hernia surgery, complicated by colon surgery, and Coumadin was stopped. It has since been restarted, and he is compliant with his INRs.     He is here today for four month follow-up. Generally, he is doing well: no chest pain, pressure or discomfort; occasional symptomatic palpitations, but nonsustasined; no shortness of breath, orthopnea or PND; no dizziness or syncope; no LE edema. Generally, he is doing well. He does have ongoing knee pain.    Past Medical History:   Diagnosis Date   • Arthritis    • Asthma    • Atrial fibrillation (HCC) 01/2020   • Benign essential hypertension     • Bowel habit changes     Constipation   • Breath shortness    • Cancer (HCC) ?2014    Bladder and breast.   • Cataract    • Dental disorder     Lower dentures.   • Depression    • Dilated cardiomyopathy (HCC) 04/2020    Echocardiogram with normal LV size, LVEF 40%, mild global hypokinesis. Severely dilated LA, mildly increased RA. Mild MR. 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   • PAC (premature atrial contraction)    • Pain     Back and hip   • Palpitations    • Pneumonia 2010   • prostate     TURP     Past Surgical History:   Procedure Laterality Date   • LARA BY LAPAROSCOPY  8/8/2010     Performed by IMMANUEL GARCIA at SURGERY [facility]   • UMBILICAL HERNIA REPAIR  2010    Performed by IMMANUEL GARCIA at SURGERY [facility]   • OTHER      Bladder tumor removed   • OTHER  ?    Left breast cancer (no nipple).     History reviewed. No pertinent family history.  Social History     Socioeconomic History   • Marital status: Single     Spouse name: Not on file   • Number of children: Not on file   • Years of education: Not on file   • Highest education level: Not on file   Occupational History   • Not on file   Social Needs   • Financial resource strain: Not on file   • Food insecurity     Worry: Not on file     Inability: Not on file   • Transportation needs     Medical: Not on file     Non-medical: Not on file   Tobacco Use   • Smoking status: Former Smoker     Packs/day: 2.00     Years: 30.00     Pack years: 60.00     Quit date: 1998     Years since quittin.2   • Smokeless tobacco: Never Used   • Tobacco comment: Quit 10years ago   Substance and Sexual Activity   • Alcohol use: Yes     Comment: One glass a wine daily. 1-2/daily and sometimes whiskey and/or beer.   • Drug use: No   • Sexual activity: Not on file   Lifestyle   • Physical activity     Days per week: Not on file     Minutes per session: Not on file   • Stress: Not on file   Relationships   • Social connections     Talks on phone: Not on file     Gets together: Not on file     Attends Confucianism service: Not on file     Active member of club or organization: Not on file     Attends meetings of clubs or organizations: Not on file     Relationship status: Not on file   • Intimate partner violence     Fear of current or ex partner: Not on file     Emotionally abused: Not on file     Physically abused: Not on file     Forced sexual activity: Not on file   Other Topics Concern   • Not on file   Social History Narrative   • Not on file     Allergies   Allergen Reactions   • Antihistamine Decongestant  [Dexbrompheniramine-Pseudoeph] Palpitations   • Lipitor [Atorvastatin]      Outpatient Encounter Medications as of 9/3/2020   Medication Sig Dispense Refill   • pantoprazole (PROTONIX) 40 MG Tablet Delayed Response      • warfarin (COUMADIN) 2 MG Tab TAKE 1 TABLET BY MOUTH ALONG WITH THE 4MG TAB ON MONDAY WEDNESDAY AND FRIDAY     • warfarin (COUMADIN) 4 MG Tab TAKE 1 TABLET BY MOUTH ONCE DAILY FOR 90 DAYS     • furosemide (LASIX) 20 MG Tab      • SENNA PO Take  by mouth.     • lisinopril (PRINIVIL) 2.5 MG Tab Take 1 Tab by mouth every day. 90 Tab 3   • metoprolol SR (TOPROL XL) 25 MG TABLET SR 24 HR Take 1 Tab by mouth every day. 90 Tab 3   • albuterol (PROVENTIL) 4 MG Tab Take 4 mg by mouth every day.     • Home Care Oxygen Inhale  by mouth Continuous.     • FLUoxetine (PROZAC) 20 MG Cap Take 1 Cap by mouth every day. 30 Cap 6   • CALCIUM PO Take  by mouth.     • Ferrous Sulfate (IRON) 325 (65 FE) MG TABS Take  by mouth.     • [DISCONTINUED] furosemide (LASIX) 40 MG Tab Take 20 mg by mouth every day.     • [DISCONTINUED] raNITidine (ZANTAC) 150 MG Tab Take 150 mg by mouth 2 times a day.     • [DISCONTINUED] anastrozole (ARIMIDEX) 1 MG TABS Take 1 mg by mouth every day.     • [DISCONTINUED] magnesium chloride SR (SLOW-MAG) 535 (64 MG) MG TBCR Take 535 mg by mouth 2 Times a Day.         No facility-administered encounter medications on file as of 9/3/2020.      Review of Systems   Constitutional: Negative for chills and fever.   HENT: Negative for congestion.    Respiratory: Negative for cough and shortness of breath.    Cardiovascular: Positive for palpitations. Negative for chest pain, orthopnea, leg swelling and PND.        Rare, nonsustained.   Gastrointestinal: Negative for abdominal pain and nausea.   Musculoskeletal: Positive for joint pain. Negative for myalgias.        Mostly knee pain   Skin: Negative for rash.   Neurological: Negative for dizziness, loss of consciousness and headaches.  "  Endo/Heme/Allergies: Does not bruise/bleed easily.   Psychiatric/Behavioral: The patient does not have insomnia.         Objective:   /50 (BP Location: Left arm, Patient Position: Sitting, BP Cuff Size: Adult)   Pulse 68   Ht 1.88 m (6' 2\")   Wt 74.8 kg (164 lb 12.8 oz)   SpO2 98%   BMI 21.16 kg/m²     Physical Exam   Constitutional: He is oriented to person, place, and time. He appears well-developed and well-nourished.   HENT:   Head: Normocephalic.   Eyes: Conjunctivae are normal. Right eye exhibits no discharge.   Neck: Normal range of motion.   Cardiovascular: An irregularly irregular rhythm present.   Pulmonary/Chest: Effort normal. No respiratory distress.   Neurological: He is alert and oriented to person, place, and time.   Skin: No rash noted. No erythema.   Psychiatric: He has a normal mood and affect. His behavior is normal.     RESULTS OF ECHOCARDIOGRAM OF 4/6/2020:  Normal LV size  Moderate concentric LVH 40%  Severely dilated LA  Mildly increased RA  Mild MR  Trace AR    LABS AS OF 5/1/2020:  WBC 4.8  RBC 3.54  Hgb 11.7  Hct 35.1  Platelets 126  Glucose 86  BUN 28  Creatinine 1.49  GFR 40  Potassium 4.1  AST 17  ALT 17  Cholesterol 124  Triglycerides 58  HDL 68  LDL 44  Cholesterol/HDL ratio 1.8  TSH 2.06    Assessment:     1. Dilated cardiomyopathy (HCC)     2. Atrial fibrillation, unspecified type (HCC)     3. Chronic anticoagulation     4. Bilateral carotid artery stenosis  US-CAROTID DOPPLER BILAT   5. Essential hypertension, benign     6. Dyslipidemia     7. History of breast cancer         Medical Decision Making:  Today's Assessment / Status / Plan:     1. Dilated cardiomyopathy with LVEF 40%, stable, on BB, ACEI and Coumdin. No CHF symptoms.    2. Atrial fibrillation, rate controlled with Toprol. If he has sustained palpitations, we may need to titrate medications.    3. Chronic anticoagulation with Coumadin. He is compliant with INRs.    4. Bilateral carotid stenosis, to repeat " carotid US.    5. Hypertension, treated and stable. BP is good today.    6. Hyperlipidemia, treated with diet alone. Recent lipid panel was good.    7. History of breast and bladder cancer, he has been released from oncology.    Plan as above: repeat carotid US. Same medications for now. FU in 6-12 months, sooner if clinical condition changes.    Collaborating MD: Roberto Carlos

## 2020-09-11 DIAGNOSIS — I65.23 BILATERAL CAROTID ARTERY STENOSIS: ICD-10-CM

## 2020-09-14 ENCOUNTER — TELEPHONE (OUTPATIENT)
Dept: CARDIOLOGY | Facility: MEDICAL CENTER | Age: 85
End: 2020-09-14

## 2020-09-14 NOTE — TELEPHONE ENCOUNTER
----- Message from KEERTHI Santana sent at 9/14/2020  2:16 PM PDT -----  Please let patient know that carotid US shows NO significant stenosis bilaterally - good news!  Continue same meds, and keep FU as scheduled.  Thanks, AB

## 2021-03-04 ENCOUNTER — OFFICE VISIT (OUTPATIENT)
Dept: CARDIOLOGY | Facility: PHYSICIAN GROUP | Age: 86
End: 2021-03-04
Payer: MEDICARE

## 2021-03-04 VITALS
HEIGHT: 74 IN | SYSTOLIC BLOOD PRESSURE: 124 MMHG | BODY MASS INDEX: 20.66 KG/M2 | OXYGEN SATURATION: 98 % | DIASTOLIC BLOOD PRESSURE: 58 MMHG | WEIGHT: 161 LBS | HEART RATE: 60 BPM

## 2021-03-04 DIAGNOSIS — E78.5 DYSLIPIDEMIA: ICD-10-CM

## 2021-03-04 DIAGNOSIS — I10 ESSENTIAL HYPERTENSION, BENIGN: ICD-10-CM

## 2021-03-04 DIAGNOSIS — Z79.01 CHRONIC ANTICOAGULATION: ICD-10-CM

## 2021-03-04 DIAGNOSIS — I48.91 ATRIAL FIBRILLATION, UNSPECIFIED TYPE (HCC): ICD-10-CM

## 2021-03-04 DIAGNOSIS — G62.89 OTHER POLYNEUROPATHY: ICD-10-CM

## 2021-03-04 DIAGNOSIS — I42.0 DILATED CARDIOMYOPATHY (HCC): ICD-10-CM

## 2021-03-04 PROBLEM — G62.9 PERIPHERAL NEUROPATHY: Status: ACTIVE | Noted: 2021-03-04

## 2021-03-04 PROCEDURE — 99214 OFFICE O/P EST MOD 30 MIN: CPT | Performed by: NURSE PRACTITIONER

## 2021-03-04 ASSESSMENT — ENCOUNTER SYMPTOMS
CHILLS: 0
SHORTNESS OF BREATH: 0
PALPITATIONS: 1
LOSS OF CONSCIOUSNESS: 0
DIZZINESS: 0
COUGH: 0
ABDOMINAL PAIN: 0
MYALGIAS: 0
PND: 0
INSOMNIA: 0
BRUISES/BLEEDS EASILY: 0
NAUSEA: 0
ORTHOPNEA: 0
HEADACHES: 0
FEVER: 0

## 2021-03-04 NOTE — PROGRESS NOTES
Chief Complaint   Patient presents with   • Follow-Up   • Cardiomyopathy (Non-ischemic)   • Atrial Fibrillation   • Anticoagulation   • HTN (Controlled)   • Hyperlipidemia       Subjective:   Isaias Garrett is a 89 y.o. male who presents today for six month follow-up of dilated cardiomyopathy, atrial fibrillation, anticoagulation, hypertension and hyperlipidemia.    Isaias is a 89 year old male with history of dilated cardiomyopathy with LVEF 45% in April 2020, atrial fibrillation, anticoagulation, hypertension, and hyperlipidemia, last seen by me in September 2020.     In January/February 2020, he had hernia surgery, complicated by colon surgery, and Coumadin was stopped. It has since been restarted, and he is compliant with his INRs.  In December 2020, he started having colon problems again (pain), and had a colon resection; he now has a colostomy bag.     He is here today for six month follow-up. Generally, he is doing well: no chest pain, pressure or discomfort; occasional symptomatic palpitations, but nonsustasined; no shortness of breath, orthopnea or PND; no dizziness or syncope; no LE edema. He does have joint pain, mostly in his shoulder and knees. He does try to stay active. He does feel cold a lot, and has some numbness in his hands and fingers.    Past Medical History:   Diagnosis Date   • Arthritis    • Asthma    • Atrial fibrillation (HCC) 01/2020   • Benign essential hypertension     • Bowel habit changes     Constipation   • Breath shortness    • Cancer (HCC) ?2014    Bladder and breast.   • Carotid stenosis 09/2020    Carotid US with no significant stenosis bilaterally.   • Cataract    • Dental disorder     Lower dentures.   • Depression    • Dilated cardiomyopathy (HCC) 04/2020    Echocardiogram with normal LV size, LVEF 40%, mild global hypokinesis. Severely dilated LA, mildly increased RA. Mild MR. 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   • PAC (premature atrial contraction)    • Pain     Back and hip   • Palpitations    • Pneumonia    • prostate     TURP     Past Surgical History:   Procedure Laterality Date   • LARA BY LAPAROSCOPY  2010    Performed by IMMANUEL GARCIA at SURGERY [facility]S   • UMBILICAL HERNIA REPAIR  2010    Performed by IMMANUEL GARCIA at SURGERY [facility] OR[facility]   • OTHER      Bladder tumor removed   • OTHER  ?    Left breast cancer (no nipple).     History reviewed. No pertinent family history.  Social History     Socioeconomic History   • Marital status: Single     Spouse name: Not on file   • Number of children: Not on file   • Years of education: Not on file   • Highest education level: Not on file   Occupational History   • Not on file   Tobacco Use   • Smoking status: Former Smoker     Packs/day: 2.00     Years: 30.00     Pack years: 60.00     Quit date: 1998     Years since quittin.7   • Smokeless tobacco: Never Used   • Tobacco comment: Quit 10years ago   Substance and Sexual Activity   • Alcohol use: Yes     Comment: One glass a wine daily. 1-2/daily and sometimes whiskey and/or beer.   • Drug use: No   • Sexual activity: Not on file   Other Topics Concern   • Not on file   Social History Narrative   • Not on file     Social Determinants of Health     Financial Resource Strain:    • Difficulty of Paying Living Expenses:    Food Insecurity:    • Worried About Running Out of Food in the Last Year:    • Ran Out of Food in the Last Year:    Transportation Needs:    • Lack of Transportation (Medical):    • Lack of Transportation (Non-Medical):    Physical Activity:    • Days of Exercise per Week:    • Minutes of Exercise per Session:    Stress:    • Feeling of Stress :    Social Connections:    • Frequency of Communication with Friends and Family:    • Frequency of Social Gatherings with Friends and Family:    • Attends  Jain Services:    • Active Member of Clubs or Organizations:    • Attends Club or Organization Meetings:    • Marital Status:    Intimate Partner Violence:    • Fear of Current or Ex-Partner:    • Emotionally Abused:    • Physically Abused:    • Sexually Abused:      Allergies   Allergen Reactions   • Antihistamine Decongestant [Dexbrompheniramine-Pseudoeph] Palpitations   • Lipitor [Atorvastatin]      Outpatient Encounter Medications as of 3/4/2021   Medication Sig Dispense Refill   • pantoprazole (PROTONIX) 40 MG Tablet Delayed Response      • warfarin (COUMADIN) 2 MG Tab TAKE 1 TABLET BY MOUTH ALONG WITH THE 4MG TAB ON MONDAY WEDNESDAY AND FRIDAY     • furosemide (LASIX) 20 MG Tab      • SENNA PO Take  by mouth.     • lisinopril (PRINIVIL) 2.5 MG Tab Take 1 Tab by mouth every day. 90 Tab 3   • metoprolol SR (TOPROL XL) 25 MG TABLET SR 24 HR Take 1 Tab by mouth every day. 90 Tab 3   • albuterol (PROVENTIL) 4 MG Tab Take 4 mg by mouth every day.     • Home Care Oxygen Inhale  by mouth Continuous.     • FLUoxetine (PROZAC) 20 MG Cap Take 1 Cap by mouth every day. 30 Cap 6   • CALCIUM PO Take  by mouth.     • Ferrous Sulfate (IRON) 325 (65 FE) MG TABS Take  by mouth.     • [DISCONTINUED] warfarin (COUMADIN) 4 MG Tab TAKE 1 TABLET BY MOUTH ONCE DAILY FOR 90 DAYS       No facility-administered encounter medications on file as of 3/4/2021.     Review of Systems   Constitutional: Negative for chills and fever.   HENT: Negative for congestion.    Respiratory: Negative for cough and shortness of breath.    Cardiovascular: Positive for palpitations. Negative for chest pain, orthopnea, leg swelling and PND.        Rare, nonsustained.   Gastrointestinal: Negative for abdominal pain and nausea.   Musculoskeletal: Positive for joint pain. Negative for myalgias.        Mostly knee and shoulder pain.   Skin: Negative for rash.   Neurological: Negative for dizziness, loss of consciousness and headaches.   Endo/Heme/Allergies:  "Does not bruise/bleed easily.   Psychiatric/Behavioral: The patient does not have insomnia.         Objective:   /58 (BP Location: Left arm, Patient Position: Sitting, BP Cuff Size: Adult)   Pulse 60   Ht 1.88 m (6' 2\")   Wt 73 kg (161 lb)   SpO2 98%   BMI 20.67 kg/m²     Physical Exam   Constitutional: He is oriented to person, place, and time. He appears well-developed and well-nourished.   Ambulates with a walker.   HENT:   Head: Normocephalic.   Eyes: Conjunctivae are normal. Right eye exhibits no discharge.   Cardiovascular: An irregularly irregular rhythm present.   Pulmonary/Chest: Effort normal. No respiratory distress.   Abdominal:   Has a colostomy bag.   Musculoskeletal:         General: No edema.      Cervical back: Normal range of motion.   Neurological: He is alert and oriented to person, place, and time.   Skin: No rash noted. No erythema.   Psychiatric: He has a normal mood and affect. His behavior is normal.     RESULTS OF CAROTID US OF 9/10/2020:  No hemodynamically significantly stenosis bilaterally.    LABS AS OF 11/11/2020:  WBC 7.4  RBC 3.58  Hgb 12.0  Hct 37.2  Platelets 119  Potassium 3.3  Glucose 93  BUN 24  Creatinine 1.26  AST 18  ALT 14  Troponin 0.06    Assessment:     1. Dilated cardiomyopathy (HCC)     2. Atrial fibrillation, unspecified type (HCC)  TSH   3. Chronic anticoagulation     4. Essential hypertension, benign  CBC WITH DIFFERENTIAL   5. Dyslipidemia  Basic Metabolic Panel   6. Other polyneuropathy  TSH    VITAMIN B12       Medical Decision Making:  Today's Assessment / Status / Plan:     1. History of dilated cardiomyopathy with LVEF 45%, on BB, ACEI, and Lasix. No HF exacerbations.    2. Atrial fibrillation, chronic, rate controlled with Toprol XL.    3. Chronic anticoagulation with Coumadin. No bleeding problems.    4. Hypertension, treated with BB and ACEI, stable. BP is good today.    5. Hyperlipidemia, not currently on any therapy; no indication for statin at " his age.    6. Peripheral neuropathy, will check CBC, BMP, TSH and Vitamin B12 level.    Same medications for now. Labs as above. FU in 6 months, sooner if clinical condition changes.

## 2021-03-05 DIAGNOSIS — G62.89 OTHER POLYNEUROPATHY: ICD-10-CM

## 2021-03-05 DIAGNOSIS — I10 ESSENTIAL HYPERTENSION, BENIGN: ICD-10-CM

## 2021-03-05 DIAGNOSIS — I48.91 ATRIAL FIBRILLATION, UNSPECIFIED TYPE (HCC): ICD-10-CM

## 2021-03-05 DIAGNOSIS — E78.5 DYSLIPIDEMIA: ICD-10-CM

## 2021-03-08 ENCOUNTER — TELEPHONE (OUTPATIENT)
Dept: CARDIOLOGY | Facility: MEDICAL CENTER | Age: 86
End: 2021-03-08

## 2021-03-08 NOTE — TELEPHONE ENCOUNTER
----- Message from KEERTHI Santana sent at 3/8/2021 10:55 AM PST -----  Labs are all stable, except Vitamin B12 is a little low. Suggest FU with PCP to discuss options to treat.  Renal function is a little better. CBC and TSH are stable/normal.  Thanks, AB

## 2021-09-21 ENCOUNTER — PRE-ADMISSION TESTING (OUTPATIENT)
Dept: ADMISSIONS | Facility: MEDICAL CENTER | Age: 86
End: 2021-09-21
Attending: OPHTHALMOLOGY
Payer: MEDICARE

## 2021-09-29 ENCOUNTER — ANESTHESIA EVENT (OUTPATIENT)
Dept: SURGERY | Facility: MEDICAL CENTER | Age: 86
End: 2021-09-29
Payer: MEDICARE

## 2021-09-29 ENCOUNTER — ANESTHESIA (OUTPATIENT)
Dept: SURGERY | Facility: MEDICAL CENTER | Age: 86
End: 2021-09-29
Payer: MEDICARE

## 2021-09-29 ENCOUNTER — HOSPITAL ENCOUNTER (OUTPATIENT)
Facility: MEDICAL CENTER | Age: 86
End: 2021-09-29
Attending: OPHTHALMOLOGY | Admitting: OPHTHALMOLOGY
Payer: MEDICARE

## 2021-09-29 VITALS
HEIGHT: 75 IN | WEIGHT: 159.39 LBS | BODY MASS INDEX: 19.82 KG/M2 | SYSTOLIC BLOOD PRESSURE: 137 MMHG | TEMPERATURE: 97.4 F | RESPIRATION RATE: 20 BRPM | DIASTOLIC BLOOD PRESSURE: 65 MMHG | HEART RATE: 66 BPM | OXYGEN SATURATION: 97 %

## 2021-09-29 LAB
ANION GAP SERPL CALC-SCNC: 10 MMOL/L (ref 7–16)
BUN SERPL-MCNC: 20 MG/DL (ref 8–22)
CALCIUM SERPL-MCNC: 9.4 MG/DL (ref 8.5–10.5)
CHLORIDE SERPL-SCNC: 103 MMOL/L (ref 96–112)
CO2 SERPL-SCNC: 27 MMOL/L (ref 20–33)
CREAT SERPL-MCNC: 1.03 MG/DL (ref 0.5–1.4)
EKG IMPRESSION: NORMAL
ERYTHROCYTE [DISTWIDTH] IN BLOOD BY AUTOMATED COUNT: 50.6 FL (ref 35.9–50)
GLUCOSE SERPL-MCNC: 81 MG/DL (ref 65–99)
HCT VFR BLD AUTO: 36.6 % (ref 42–52)
HGB BLD-MCNC: 12 G/DL (ref 14–18)
INR PPP: 1.87 (ref 0.87–1.13)
MCH RBC QN AUTO: 33.2 PG (ref 27–33)
MCHC RBC AUTO-ENTMCNC: 32.8 G/DL (ref 33.7–35.3)
MCV RBC AUTO: 101.4 FL (ref 81.4–97.8)
PLATELET # BLD AUTO: 143 K/UL (ref 164–446)
PMV BLD AUTO: 11.4 FL (ref 9–12.9)
POTASSIUM SERPL-SCNC: 3.8 MMOL/L (ref 3.6–5.5)
PROTHROMBIN TIME: 20.9 SEC (ref 12–14.6)
RBC # BLD AUTO: 3.61 M/UL (ref 4.7–6.1)
SODIUM SERPL-SCNC: 140 MMOL/L (ref 135–145)
WBC # BLD AUTO: 6.1 K/UL (ref 4.8–10.8)

## 2021-09-29 PROCEDURE — 85610 PROTHROMBIN TIME: CPT

## 2021-09-29 PROCEDURE — 160048 HCHG OR STATISTICAL LEVEL 1-5: Performed by: OPHTHALMOLOGY

## 2021-09-29 PROCEDURE — 501838 HCHG SUTURE GENERAL: Performed by: OPHTHALMOLOGY

## 2021-09-29 PROCEDURE — 700101 HCHG RX REV CODE 250: Performed by: OPHTHALMOLOGY

## 2021-09-29 PROCEDURE — 500145 HCHG CANNULA, ANTERIOR CHAMBER RND*: Performed by: OPHTHALMOLOGY

## 2021-09-29 PROCEDURE — 87102 FUNGUS ISOLATION CULTURE: CPT

## 2021-09-29 PROCEDURE — 160028 HCHG SURGERY MINUTES - 1ST 30 MINS LEVEL 3: Performed by: OPHTHALMOLOGY

## 2021-09-29 PROCEDURE — 87075 CULTR BACTERIA EXCEPT BLOOD: CPT

## 2021-09-29 PROCEDURE — 160046 HCHG PACU - 1ST 60 MINS PHASE II: Performed by: OPHTHALMOLOGY

## 2021-09-29 PROCEDURE — 87205 SMEAR GRAM STAIN: CPT

## 2021-09-29 PROCEDURE — 87070 CULTURE OTHR SPECIMN AEROBIC: CPT

## 2021-09-29 PROCEDURE — 700111 HCHG RX REV CODE 636 W/ 250 OVERRIDE (IP): Performed by: ANESTHESIOLOGY

## 2021-09-29 PROCEDURE — 700102 HCHG RX REV CODE 250 W/ 637 OVERRIDE(OP)

## 2021-09-29 PROCEDURE — 85027 COMPLETE CBC AUTOMATED: CPT

## 2021-09-29 PROCEDURE — 500882 HCHG PACK, EYE CUSTOM CATARACT: Performed by: OPHTHALMOLOGY

## 2021-09-29 PROCEDURE — 160009 HCHG ANES TIME/MIN: Performed by: OPHTHALMOLOGY

## 2021-09-29 PROCEDURE — C1762 CONN TISS, HUMAN(INC FASCIA): HCPCS | Performed by: OPHTHALMOLOGY

## 2021-09-29 PROCEDURE — 501425 HCHG SPONGE, WECKCEL SPEAR: Performed by: OPHTHALMOLOGY

## 2021-09-29 PROCEDURE — 87015 SPECIMEN INFECT AGNT CONCNTJ: CPT

## 2021-09-29 PROCEDURE — 700105 HCHG RX REV CODE 258: Performed by: ANESTHESIOLOGY

## 2021-09-29 PROCEDURE — A9270 NON-COVERED ITEM OR SERVICE: HCPCS

## 2021-09-29 PROCEDURE — 93005 ELECTROCARDIOGRAM TRACING: CPT | Performed by: OPHTHALMOLOGY

## 2021-09-29 PROCEDURE — 160039 HCHG SURGERY MINUTES - EA ADDL 1 MIN LEVEL 3: Performed by: OPHTHALMOLOGY

## 2021-09-29 PROCEDURE — 502000 HCHG MISC OR IMPLANTS RC 0278: Performed by: OPHTHALMOLOGY

## 2021-09-29 PROCEDURE — 80048 BASIC METABOLIC PNL TOTAL CA: CPT

## 2021-09-29 PROCEDURE — 700101 HCHG RX REV CODE 250

## 2021-09-29 PROCEDURE — 160002 HCHG RECOVERY MINUTES (STAT): Performed by: OPHTHALMOLOGY

## 2021-09-29 PROCEDURE — 160035 HCHG PACU - 1ST 60 MINS PHASE I: Performed by: OPHTHALMOLOGY

## 2021-09-29 PROCEDURE — 93010 ELECTROCARDIOGRAM REPORT: CPT | Performed by: INTERNAL MEDICINE

## 2021-09-29 PROCEDURE — 700111 HCHG RX REV CODE 636 W/ 250 OVERRIDE (IP): Performed by: OPHTHALMOLOGY

## 2021-09-29 PROCEDURE — 500558 HCHG EYE SHIELD W/GARTER (FOX): Performed by: OPHTHALMOLOGY

## 2021-09-29 PROCEDURE — A6410 STERILE EYE PAD: HCPCS | Performed by: OPHTHALMOLOGY

## 2021-09-29 PROCEDURE — 160025 RECOVERY II MINUTES (STATS): Performed by: OPHTHALMOLOGY

## 2021-09-29 RX ORDER — HYDRALAZINE HYDROCHLORIDE 20 MG/ML
INJECTION INTRAMUSCULAR; INTRAVENOUS PRN
Status: DISCONTINUED | OUTPATIENT
Start: 2021-09-29 | End: 2021-09-29 | Stop reason: SURG

## 2021-09-29 RX ORDER — SODIUM CHLORIDE, SODIUM LACTATE, POTASSIUM CHLORIDE, CALCIUM CHLORIDE 600; 310; 30; 20 MG/100ML; MG/100ML; MG/100ML; MG/100ML
INJECTION, SOLUTION INTRAVENOUS CONTINUOUS
Status: DISCONTINUED | OUTPATIENT
Start: 2021-09-29 | End: 2021-09-29 | Stop reason: HOSPADM

## 2021-09-29 RX ORDER — CEFAZOLIN SODIUM 1 G/3ML
INJECTION, POWDER, FOR SOLUTION INTRAMUSCULAR; INTRAVENOUS
Status: DISCONTINUED | OUTPATIENT
Start: 2021-09-29 | End: 2021-09-29 | Stop reason: HOSPADM

## 2021-09-29 RX ORDER — METHYLPREDNISOLONE SODIUM SUCCINATE 125 MG/2ML
INJECTION, POWDER, LYOPHILIZED, FOR SOLUTION INTRAMUSCULAR; INTRAVENOUS
Status: COMPLETED
Start: 2021-09-29 | End: 2021-09-29

## 2021-09-29 RX ORDER — PILOCARPINE HYDROCHLORIDE 20 MG/ML
SOLUTION/ DROPS OPHTHALMIC
Status: COMPLETED
Start: 2021-09-29 | End: 2021-09-29

## 2021-09-29 RX ORDER — ONDANSETRON 2 MG/ML
4 INJECTION INTRAMUSCULAR; INTRAVENOUS
Status: DISCONTINUED | OUTPATIENT
Start: 2021-09-29 | End: 2021-09-29 | Stop reason: HOSPADM

## 2021-09-29 RX ORDER — SODIUM CHLORIDE, SODIUM LACTATE, POTASSIUM CHLORIDE, CALCIUM CHLORIDE 600; 310; 30; 20 MG/100ML; MG/100ML; MG/100ML; MG/100ML
INJECTION, SOLUTION INTRAVENOUS
Status: DISCONTINUED | OUTPATIENT
Start: 2021-09-29 | End: 2021-09-29 | Stop reason: SURG

## 2021-09-29 RX ORDER — METOPROLOL TARTRATE 1 MG/ML
1 INJECTION, SOLUTION INTRAVENOUS
Status: DISCONTINUED | OUTPATIENT
Start: 2021-09-29 | End: 2021-09-29 | Stop reason: HOSPADM

## 2021-09-29 RX ORDER — TETRACAINE HYDROCHLORIDE 5 MG/ML
SOLUTION OPHTHALMIC
Status: DISCONTINUED | OUTPATIENT
Start: 2021-09-29 | End: 2021-09-29 | Stop reason: HOSPADM

## 2021-09-29 RX ORDER — HYDRALAZINE HYDROCHLORIDE 20 MG/ML
5 INJECTION INTRAMUSCULAR; INTRAVENOUS
Status: DISCONTINUED | OUTPATIENT
Start: 2021-09-29 | End: 2021-09-29 | Stop reason: HOSPADM

## 2021-09-29 RX ORDER — SODIUM CHLORIDE 9 MG/ML
500 INJECTION, SOLUTION INTRAVENOUS
Status: DISCONTINUED | OUTPATIENT
Start: 2021-09-29 | End: 2021-09-29 | Stop reason: HOSPADM

## 2021-09-29 RX ORDER — MOXIFLOXACIN 5 MG/ML
SOLUTION/ DROPS OPHTHALMIC
Status: COMPLETED
Start: 2021-09-29 | End: 2021-09-29

## 2021-09-29 RX ORDER — METHYLPREDNISOLONE SODIUM SUCCINATE 125 MG/2ML
INJECTION, POWDER, LYOPHILIZED, FOR SOLUTION INTRAMUSCULAR; INTRAVENOUS PRN
Status: DISCONTINUED | OUTPATIENT
Start: 2021-09-29 | End: 2021-09-29 | Stop reason: SURG

## 2021-09-29 RX ORDER — DEXAMETHASONE SODIUM PHOSPHATE 4 MG/ML
INJECTION, SOLUTION INTRA-ARTICULAR; INTRALESIONAL; INTRAMUSCULAR; INTRAVENOUS; SOFT TISSUE
Status: DISCONTINUED | OUTPATIENT
Start: 2021-09-29 | End: 2021-09-29 | Stop reason: HOSPADM

## 2021-09-29 RX ORDER — LIDOCAINE HYDROCHLORIDE 20 MG/ML
INJECTION, SOLUTION EPIDURAL; INFILTRATION; INTRACAUDAL; PERINEURAL
Status: DISCONTINUED | OUTPATIENT
Start: 2021-09-29 | End: 2021-09-29 | Stop reason: HOSPADM

## 2021-09-29 RX ORDER — LABETALOL HYDROCHLORIDE 5 MG/ML
5 INJECTION, SOLUTION INTRAVENOUS
Status: DISCONTINUED | OUTPATIENT
Start: 2021-09-29 | End: 2021-09-29 | Stop reason: HOSPADM

## 2021-09-29 RX ORDER — BALANCED SALT SOLUTION 6.4; .75; .48; .3; 3.9; 1.7 MG/ML; MG/ML; MG/ML; MG/ML; MG/ML; MG/ML
SOLUTION OPHTHALMIC
Status: DISCONTINUED | OUTPATIENT
Start: 2021-09-29 | End: 2021-09-29 | Stop reason: HOSPADM

## 2021-09-29 RX ADMIN — HYDRALAZINE HYDROCHLORIDE 5 MG: 20 INJECTION INTRAMUSCULAR; INTRAVENOUS at 14:51

## 2021-09-29 RX ADMIN — METHYLPREDNISOLONE SODIUM SUCCINATE 125 MG: 125 INJECTION, POWDER, FOR SOLUTION INTRAMUSCULAR; INTRAVENOUS at 14:28

## 2021-09-29 RX ADMIN — HYDRALAZINE HYDROCHLORIDE 5 MG: 20 INJECTION INTRAMUSCULAR; INTRAVENOUS at 12:35

## 2021-09-29 RX ADMIN — PROPOFOL 20 MCG/KG/MIN: 10 INJECTION, EMULSION INTRAVENOUS at 14:06

## 2021-09-29 RX ADMIN — PILOCARPINE HYDROCHLORIDE: 20 SOLUTION/ DROPS OPHTHALMIC at 13:02

## 2021-09-29 RX ADMIN — HYDRALAZINE HYDROCHLORIDE 5 MG: 20 INJECTION INTRAMUSCULAR; INTRAVENOUS at 13:02

## 2021-09-29 RX ADMIN — MOXIFLOXACIN HYDROCHLORIDE 1 DROP: 5 SOLUTION/ DROPS OPHTHALMIC at 13:03

## 2021-09-29 RX ADMIN — SODIUM CHLORIDE, POTASSIUM CHLORIDE, SODIUM LACTATE AND CALCIUM CHLORIDE: 600; 310; 30; 20 INJECTION, SOLUTION INTRAVENOUS at 13:59

## 2021-09-29 NOTE — ANESTHESIA POSTPROCEDURE EVALUATION
Patient: Tej Garrett    Procedure Summary     Date: 09/29/21 Room / Location: [facility] ROOM 28 / SURGERY SAME DAY [facility]    Anesthesia Start: 1359 Anesthesia Stop: 1524    Procedure: DESCEMET'S STRIPPING ENDOTHELIAL KERATOPLASTY (DSEK) (Right Eye) Diagnosis: (CORNEAL EDEMA & CORNEAL DYSTROPHY)    Surgeons: Praful Mata M.D. Responsible Provider: Susy Sam M.D.    Anesthesia Type: MAC ASA Status: 3          Final Anesthesia Type: MAC  Last vitals  BP   Blood Pressure : (!) 181/95    Temp   36.1 °C (97 °F)    Pulse   (!) 56   Resp        SpO2   97 %      Anesthesia Post Evaluation    Patient location during evaluation: PACU  Patient participation: complete - patient participated  Level of consciousness: awake and alert    Airway patency: patent  Anesthetic complications: no  Cardiovascular status: hemodynamically stable  Respiratory status: acceptable  Hydration status: euvolemic    PONV: none          No complications documented.

## 2021-09-29 NOTE — OR SURGEON
Immediate Post OP Note    PreOp Diagnosis: Psuedophakic Bullous Keratopathy      PostOp Diagnosis: Pseudophakic Bullous Keratopathy      Procedure(s):  DESCEMET'S STRIPPING ENDOTHELIAL KERATOPLASTY (DSEK), GAS SF6 - Wound Class: Clean    Surgeon(s):  Praful Mata M.D.    Anesthesiologist/Type of Anesthesia:  Anesthesiologist: Susy Sam M.D./EMIL    Surgical Staff:  Circulator: Anita A Reyes, R.N.; Radha Matta R.N.  Scrub Person: Dorothy Everett    Specimens removed if any:  ID Type Source Tests Collected by Time Destination   1 : donor corneal broth Other Other FUNGAL CULTURE Praful Mata M.D. 9/29/2021  3:00 PM    2 : donor corneal rim Other Other FUNGAL CULTURE Praful Mata M.D. 9/29/2021  3:01 PM        Estimated Blood Loss: Minimal    Findings: Bullous Keratopathy    Complications: None        9/29/2021 4:13 PM Praful Mata M.D.

## 2021-09-29 NOTE — ANESTHESIA TIME REPORT
Anesthesia Start and Stop Event Times     Date Time Event    9/29/2021 1355 Ready for Procedure     1359 Anesthesia Start     1524 Anesthesia Stop        Responsible Staff  09/29/21    Name Role Begin End    Susy Sam M.D. Anesth 1359 1524        Preop Diagnosis (Free Text):  Pre-op Diagnosis     CORNEAL EDEMA & CORNEAL DYSTROPHY        Preop Diagnosis (Codes):    Premium Reason  A. 3PM - 7AM    Comments:

## 2021-09-29 NOTE — DISCHARGE INSTRUCTIONS
ACTIVITY: Rest and take it easy for the first 24 hours.  A responsible adult is recommended to remain with you during that time.  It is normal to feel sleepy.  We encourage you to not do anything that requires balance, judgment or coordination.    MILD FLU-LIKE SYMPTOMS ARE NORMAL. YOU MAY EXPERIENCE GENERALIZED MUSCLE ACHES, THROAT IRRITATION, HEADACHE AND/OR SOME NAUSEA.    FOR 24 HOURS DO NOT:  Drive, operate machinery or run household appliances.  Drink beer or alcoholic beverages.   Make important decisions or sign legal documents.    SPECIAL INSTRUCTIONS:     DIET: To avoid nausea, slowly advance diet as tolerated, avoiding spicy or greasy foods for the first day.  Add more substantial food to your diet according to your physician's instructions.  Babies can be fed formula or breast milk as soon as they are hungry.  INCREASE FLUIDS AND FIBER TO AVOID CONSTIPATION.    SURGICAL DRESSING/BATHING:     Keep the eye patch and dressing clean and dry until surgeon instructs otherwise.   Do no remove eye path.   Look up (up to the ceiling) at home.       You should CALL YOUR PHYSICIAN if you develop:  Fever greater than 101 degrees F.  Pain not relieved by medication, or persistent nausea or vomiting.  Excessive bleeding (blood soaking through dressing) or unexpected drainage from the wound.  Extreme redness or swelling around the incision site, drainage of pus or foul smelling drainage.  Inability to urinate or empty your bladder within 8 hours.  Problems with breathing or chest pain.    You should call 911 if you develop problems with breathing or chest pain.  If you are unable to contact your doctor or surgical center, you should go to the nearest emergency room or urgent care center.      Physician's telephone #: [phone]    If any questions arise, call your doctor.  If your doctor is not available, please feel free to call the Surgical Center at [phone]. The Contact Center is open Monday through Friday 7AM  to 5PM and may speak to a nurse at [phone], or toll free at [phone].     A registered nurse may call you a few days after your surgery to see how you are doing after your procedure.    MEDICATIONS: Resume taking daily medication.  Take prescribed pain medication with food.  If no medication is prescribed, you may take non-aspirin pain medication if needed.  PAIN MEDICATION CAN BE VERY CONSTIPATING.  Take a stool softener or laxative such as senokot, pericolace, or milk of magnesia if needed.      If your physician has prescribed pain medication that includes Acetaminophen (Tylenol), do not take additional Acetaminophen (Tylenol) while taking the prescribed medication.    Depression / Suicide Risk    As you are discharged from this [facility] facility, it is important to learn how to keep safe from harming yourself.    Recognize the warning signs:  · Abrupt changes in personality, positive or negative- including increase in energy   · Giving away possessions  · Change in eating patterns- significant weight changes-  positive or negative  · Change in sleeping patterns- unable to sleep or sleeping all the time   · Unwillingness or inability to communicate  · Depression  · Unusual sadness, discouragement and loneliness  · Talk of wanting to die  · Neglect of personal appearance   · Rebelliousness- reckless behavior  · Withdrawal from people/activities they love  · Confusion- inability to concentrate     If you or a loved one observes any of these behaviors or has concerns about self-harm, here's what you can do:  · Talk about it- your feelings and reasons for harming yourself  · Remove any means that you might use to hurt yourself (examples: pills, rope, extension cords, firearm)  · Get professional help from the community (Mental Health, Substance Abuse, psychological counseling)  · Do not be alone:Call your Safe Contact- someone whom you trust who will be there for you.  · Call your local CRISIS  HOTLINE 460-9589 or 714-244-9721  · Call your local Children's Mobile Crisis Response Team [place] (559) 481-3364 or www.[employer]  · Call the toll free National Suicide Prevention Hotlines   · National Suicide Prevention Lifeline 649-263-TUWN (6247)  · National avocadostore Line Network 800-SUICIDE (767-6112)

## 2021-09-29 NOTE — ANESTHESIA PREPROCEDURE EVALUATION
Relevant Problems   PULMONARY   (positive) Dyspnea      NEURO   (positive) History of breast cancer      CARDIAC   (positive) Aortic root dilatation   (positive) Atrial fibrillation (HCC)   (positive) Carotid artery stenosis   (positive) Essential hypertension, benign   (positive) PAC (premature atrial contraction)       Physical Exam    Airway   Mallampati: III  TM distance: >3 FB  Neck ROM: full       Cardiovascular - normal exam  Rhythm: irregular  Rate: normal  (-) murmur     Dental - normal exam           Pulmonary - normal exam  Breath sounds clear to auscultation     Abdominal    Neurological - normal exam                 Anesthesia Plan    ASA 3   ASA physical status 3 criteria: other (comment) and CAD/stents (> 3 months)    Plan - MAC         (Dilated cardiomyopathy with reduced function, bradycardia, a fib, hx CAD, carotid stenosis - here for eye surgery.  First BP on arrival was 226/111.  Patient is on HTN medication and maybe held his morning dose of metoprolol.  Gave patient 2 doses of hydralazine - SBP in preop came down to 180mmHg.  Team discussion at bedside.  Decision to proceed with surgery via MAC.  Back-up GA.  Patient will have someone stay with him tonight.  )      Induction: intravenous      Pertinent diagnostic labs and testing reviewed    Informed Consent:    Anesthetic plan and risks discussed with patient.

## 2021-09-29 NOTE — OR NURSING
1519: Patient from OR awake via gurney to PACU. R eye patch with dressing clean,dry and intact. Report from the anesthesiologist and RN received. Lay flat x 1 hour. Colostomy bag intact.    1543: Patient tolerating sips of water.     1600: Dr Mata at bedside to update patient.    1615: Discharge instructions discussed with friend Mora. All questions answered and verbalizes understanding.      1630: Bedrest done. VSS. DC instructions given. Questions answered.R eye patch CDI. No c/o pain or nausea. Patient wide awake. VSS. Discussed diet, activity, medications, follow up care and worsening symptoms. Patient met criteria for discharge.

## 2021-09-29 NOTE — OR NURSING
1215 BP elevated, systolic in 200s. Charge RN updated Dr. Sam, anesthesia, orders received.     1235 PIV started, unable to draw. Blood drawn via peripheral stick. Medicated for BP per MAR.    1302 BP unchanged, medicated again per MAR.    1318 BP now 181/95. Dr. Sam notified of current BP and abnormal EKG, instructed to hold further BP intervention at this time.

## 2021-09-30 LAB
FUNGUS SPEC FUNGUS STN: NORMAL
FUNGUS SPEC FUNGUS STN: NORMAL
GRAM STN SPEC: NORMAL
GRAM STN SPEC: NORMAL
SIGNIFICANT IND 70042: NORMAL
SITE SITE: NORMAL
SOURCE SOURCE: NORMAL

## 2021-09-30 NOTE — OP REPORT
DATE OF SERVICE:  09/29/2021     PREOPERATIVE DIAGNOSIS:  Endothelial failure pseudophakic keratopathy.     POSTOPERATIVE DIAGNOSIS:  Endothelial failure, pseudophakic bullous   keratopathy, right eye.     PROCEDURE:  Descemet stripping endothelial keratoplasty, right eye.     ANESTHESIA:  Monitored anesthesia control, topical.     COMPLICATIONS:  None.     ESTIMATED BLOOD LOSS:  Minimal.     SPECIMEN: Donor rim and broth.     BLOOD PRODUCTS:  None.     IMPLANTS: Donor tissue as noted in the chart.  Tissue ID from the [facility] is  Odyssey DSAEK.     DISPOSITION:  At home.     INDICATIONS FOR PROCEDURE:  The patient developed progressive loss of vision,   now affecting activities of daily living and consistent with corneal edema   obstructing light transmission.  Informed consent was obtained after   discussing risks, benefits and alternatives.     DESCRIPTION OF PROCEDURE:  The patient was brought to the operating room where   a timeout was performed, topical anesthetic drops were placed in the eyelids.    Lids and ocular structures were prepped and draped in the usual sterile   fashion for ophthalmic surgery.  Eyelid speculum was placed.  Superior and   inferior nasal paracenteses were made as well as a paracentesis for the   anterior chamber maintainer.  Preservative-free lidocaine was injected through   the paracentesis into the anterior chamber.  The anterior chamber was   subsequently filled with viscoelastic material and a temporal clear corneal   incision was made and marked to later be enlarged to 4 mm.  An 8 mm corneal   marker was used to delineate the intended region of Descemet removal and a   reverse Sinskey hook was used to do so. After the tissue was removed, I and A   was performed removing the viscoelastic and Trypan was injected to look for   any residual tags; there were none and the Trypan was removed with I and A.    The anterior chamber maintainer was inserted in the eye and  the temporal wound   was enlarged to 4 mm.  The donor tissue was brought to the table and   transferred to a donor punch in sterile fashion.  The 8 mm donor punch was   used to prepare the DSEK tissue.  This was then loaded onto the Busin glide   and intraocular forceps were used to pull the tissue into place.  An air   bubble was placed underneath the tissue to hold it in place while two 10-0   nylon sutures were used to close the temporal wound and BSS was used to   hydrate all other wounds.  The wounds were found to be watertight.  The air   bubble was allowed to sit for 10 minutes.  Then, it was replaced with SF6 gas,   which was adjusted to approximately 60% fill with a good inferior meniscus,   after which Ancef and dexamethasone were injected subconjunctivally and 125 mg   of Solu-Medrol were placed through IV by Anesthesia. The patient was taken to   the postoperative area in good condition and will remain with face-up   positioning for one hour prior to discharge.     POSTOPERATIVE PLAN:  The patient will leave the patch and shield in place   until he see me in clinic tomorrow.  They know to maintain face-up positioning   except for taking care of bodily functions and eating.      ______________________________  Praful THAKUR/LUIZ/OFELIA    DD:  09/29/2021 16:00  DT:  09/29/2021 18:12    Job#:  [id number]

## 2021-10-02 LAB
BACTERIA WND AEROBE CULT: NORMAL
BACTERIA WND AEROBE CULT: NORMAL
GRAM STN SPEC: NORMAL
GRAM STN SPEC: NORMAL
SIGNIFICANT IND 70042: NORMAL
SIGNIFICANT IND 70042: NORMAL
SITE SITE: NORMAL
SITE SITE: NORMAL
SOURCE SOURCE: NORMAL
SOURCE SOURCE: NORMAL

## 2021-10-04 LAB
BACTERIA SPEC ANAEROBE CULT: NORMAL
BACTERIA SPEC ANAEROBE CULT: NORMAL
SIGNIFICANT IND 70042: NORMAL
SIGNIFICANT IND 70042: NORMAL
SITE SITE: NORMAL
SITE SITE: NORMAL
SOURCE SOURCE: NORMAL
SOURCE SOURCE: NORMAL

## 2021-10-27 LAB
FUNGUS SPEC CULT: NORMAL
FUNGUS SPEC CULT: NORMAL
FUNGUS SPEC FUNGUS STN: NORMAL
FUNGUS SPEC FUNGUS STN: NORMAL
SIGNIFICANT IND 70042: NORMAL
SIGNIFICANT IND 70042: NORMAL
SITE SITE: NORMAL
SITE SITE: NORMAL
SOURCE SOURCE: NORMAL
SOURCE SOURCE: NORMAL

## (undated) DEVICE — SYRINGE DISP. 50CC LS - (40/BX)

## (undated) DEVICE — SUTURE 10-0 ETHILON TG160-4-3M (12PK/BX)

## (undated) DEVICE — SYRINGE NON SAFETY 3 CC 21 GA X 1 1/2 IN (100/BX 8BX/CA)

## (undated) DEVICE — SYRINGE NON SAFETY TB 1 CC 27 GA X 1/2 IN (100/BX 8BX/CA)

## (undated) DEVICE — NEEDLE  NON-SAFETY 30GA X 3/4 IN (10EA/CA)

## (undated) DEVICE — SUTURE GENERAL

## (undated) DEVICE — ELECTRODE 850 FOAM ADHESIVE - HYDROGEL RADIOTRNSPRNT (50/PK)

## (undated) DEVICE — PACK CATARACT GENERAL (4EA/CA)

## (undated) DEVICE — FILTER EYE MILLIPORE (50EA/PK)

## (undated) DEVICE — WATER IRRIGATION STERILE 1000ML (12EA/CA)

## (undated) DEVICE — KNIFE 2.75MM ANGL SNGL BEV/SAFETY (10/CA 10/SP)

## (undated) DEVICE — CANNULA INJECTION 27G (EYE) - 10/BX ALCON

## (undated) DEVICE — TIP POLYMER I&A

## (undated) DEVICE — CONTAINER SPECIMEN BAG OR - STERILE 4 OZ W/LID (100EA/CA)

## (undated) DEVICE — SODIUM CHL IRRIGATION 0.9% 1000ML (12EA/CA)

## (undated) DEVICE — MEDICINE CUP STERILE 2 OZ - (100/CA)

## (undated) DEVICE — GOWN WARMING STANDARD FLEX - (30/CA)

## (undated) DEVICE — PEN SKIN MARKER W/RULER - (50EA/BX)

## (undated) DEVICE — CLOSURE SKIN STRIP 1/2 X 4 IN - (STERI STRIP) (50/BX 4BX/CA)

## (undated) DEVICE — SET LEADWIRE 5 LEAD BEDSIDE DISPOSABLE ECG (1SET OF 5/EA)

## (undated) DEVICE — NEEDLE SAFETY 18 GA X 1 1/2 IN (100EA/BX)

## (undated) DEVICE — SLEEVE VASO CALF MED - (10PR/CA)

## (undated) DEVICE — PAD EYE GAUZE COVERED OVAL 1 5/8 X 2 5/8" STERILE"

## (undated) DEVICE — GLOVE SZ 6 BIOGEL PI MICRO - PF LF (50PR/BX 4BX/CA)

## (undated) DEVICE — SET EXTENSION WITH 2 PORTS (48EA/CA) ***PART #2C8610 IS A SUBSTITUTE*****

## (undated) DEVICE — TUBING CLEARLINK DUO-VENT - C-FLO (48EA/CA)

## (undated) DEVICE — TOWEL STOP TIMEOUT SAFETY FLAG (40EA/CA)

## (undated) DEVICE — SUCTION INSTRUMENT YANKAUER BULBOUS TIP W/O VENT (50EA/CA)

## (undated) DEVICE — SUTURE 10-0 NYLON (12EA/BX)

## (undated) DEVICE — CANISTER SUCTION RIGID RED 1500CC (40EA/CA)

## (undated) DEVICE — CATHETER IV 20 GA X 1-1/4 ---SURG.& SDS ONLY--- (50EA/BX)

## (undated) DEVICE — CANISTER SUCTION 3000ML MECHANICAL FILTER AUTO SHUTOFF MEDI-VAC NONSTERILE LF DISP  (40EA/CA)

## (undated) DEVICE — NEEDLE NON SAFETY 27GA X 1-1/4 IN HYPO (100EA/BX)

## (undated) DEVICE — LACTATED RINGERS INJ 1000 ML - (14EA/CA 60CA/PF)

## (undated) DEVICE — KIT  I.V. START (100EA/CA)

## (undated) DEVICE — GLOVE BIOGEL SZ 7.5 SURGICAL PF LTX - (50PR/BX 4BX/CA)

## (undated) DEVICE — SHIELD OPTH AL GRTR CVR FOX (50EA/BX)

## (undated) DEVICE — TUBE CONNECTING SUCTION - CLEAR PLASTIC STERILE 72 IN (50EA/CA)

## (undated) DEVICE — CLOSURE WOUND 1/4 X 4 (STERI - STRIP) (50/BX 4BX/CA)

## (undated) DEVICE — GLOVE SZ 7.5 BIOGEL PI MICRO - PF LF (50PR/BX)

## (undated) DEVICE — PUNCH CORNEAL VACUUM 8.00

## (undated) DEVICE — SPEAR EYE SPNG 3ANG MLBL HNDL - (10/ST18ST/PK 180/PK 1PK/SP)

## (undated) DEVICE — CANNULA ANTERIOR CHAMBER 30GA - (10/BX)

## (undated) DEVICE — CANNULA SUB-TENONS ANESTH. 1.1X25MM 19GAX1IN (10EA/SP)

## (undated) DEVICE — KIT ANESTHESIA W/CIRCUIT & 3/LT BAG W/FILTER (20EA/CA)

## (undated) DEVICE — SENSOR SPO2 NEO LNCS ADHESIVE (20/BX) SEE USER NOTES